# Patient Record
Sex: MALE | Race: WHITE | URBAN - METROPOLITAN AREA
[De-identification: names, ages, dates, MRNs, and addresses within clinical notes are randomized per-mention and may not be internally consistent; named-entity substitution may affect disease eponyms.]

---

## 2018-03-29 ENCOUNTER — TELEPHONE (OUTPATIENT)
Dept: BEHAVIORAL HEALTH | Facility: CLINIC | Age: 43
End: 2018-03-29

## 2018-03-29 ENCOUNTER — HOSPITAL ENCOUNTER (INPATIENT)
Facility: CLINIC | Age: 43
LOS: 6 days | Discharge: HOME | DRG: 885 | End: 2018-04-04
Attending: FAMILY MEDICINE | Admitting: PSYCHIATRY & NEUROLOGY
Payer: COMMERCIAL

## 2018-03-29 DIAGNOSIS — F22 DELUSION (H): ICD-10-CM

## 2018-03-29 DIAGNOSIS — F31.12 BIPOLAR AFFECTIVE DISORDER, CURRENTLY MANIC, MODERATE (H): ICD-10-CM

## 2018-03-29 DIAGNOSIS — F29 PSYCHOSIS, UNSPECIFIED PSYCHOSIS TYPE (H): Primary | ICD-10-CM

## 2018-03-29 LAB
ALBUMIN UR-MCNC: NEGATIVE MG/DL
AMPHETAMINES UR QL SCN: NEGATIVE
ANION GAP SERPL CALCULATED.3IONS-SCNC: 9 MMOL/L (ref 3–14)
APPEARANCE UR: CLEAR
BARBITURATES UR QL: NEGATIVE
BASOPHILS # BLD AUTO: 0 10E9/L (ref 0–0.2)
BASOPHILS NFR BLD AUTO: 0.3 %
BENZODIAZ UR QL: NEGATIVE
BILIRUB UR QL STRIP: NEGATIVE
BUN SERPL-MCNC: 14 MG/DL (ref 7–30)
CALCIUM SERPL-MCNC: 8.8 MG/DL (ref 8.5–10.1)
CANNABINOIDS UR QL SCN: NEGATIVE
CHLORIDE SERPL-SCNC: 107 MMOL/L (ref 94–109)
CO2 SERPL-SCNC: 26 MMOL/L (ref 20–32)
COCAINE UR QL: NEGATIVE
COLOR UR AUTO: NORMAL
CREAT SERPL-MCNC: 0.75 MG/DL (ref 0.66–1.25)
DIFFERENTIAL METHOD BLD: NORMAL
EOSINOPHIL # BLD AUTO: 0.1 10E9/L (ref 0–0.7)
EOSINOPHIL NFR BLD AUTO: 0.8 %
ERYTHROCYTE [DISTWIDTH] IN BLOOD BY AUTOMATED COUNT: 12.8 % (ref 10–15)
ETHANOL UR QL SCN: NEGATIVE
GFR SERPL CREATININE-BSD FRML MDRD: >90 ML/MIN/1.7M2
GLUCOSE SERPL-MCNC: 136 MG/DL (ref 70–99)
GLUCOSE UR STRIP-MCNC: NEGATIVE MG/DL
HCT VFR BLD AUTO: 42.6 % (ref 40–53)
HGB BLD-MCNC: 14.1 G/DL (ref 13.3–17.7)
HGB UR QL STRIP: NEGATIVE
IMM GRANULOCYTES # BLD: 0 10E9/L (ref 0–0.4)
IMM GRANULOCYTES NFR BLD: 0.2 %
KETONES UR STRIP-MCNC: NEGATIVE MG/DL
LEUKOCYTE ESTERASE UR QL STRIP: NEGATIVE
LYMPHOCYTES # BLD AUTO: 1.6 10E9/L (ref 0.8–5.3)
LYMPHOCYTES NFR BLD AUTO: 26 %
MCH RBC QN AUTO: 30.2 PG (ref 26.5–33)
MCHC RBC AUTO-ENTMCNC: 33.1 G/DL (ref 31.5–36.5)
MCV RBC AUTO: 91 FL (ref 78–100)
MONOCYTES # BLD AUTO: 0.3 10E9/L (ref 0–1.3)
MONOCYTES NFR BLD AUTO: 4.5 %
NEUTROPHILS # BLD AUTO: 4.1 10E9/L (ref 1.6–8.3)
NEUTROPHILS NFR BLD AUTO: 68.2 %
NITRATE UR QL: NEGATIVE
NRBC # BLD AUTO: 0 10*3/UL
NRBC BLD AUTO-RTO: 0 /100
OPIATES UR QL SCN: NEGATIVE
PH UR STRIP: 5 PH (ref 5–7)
PLATELET # BLD AUTO: 260 10E9/L (ref 150–450)
POTASSIUM SERPL-SCNC: 3.7 MMOL/L (ref 3.4–5.3)
RBC # BLD AUTO: 4.67 10E12/L (ref 4.4–5.9)
SODIUM SERPL-SCNC: 142 MMOL/L (ref 133–144)
SOURCE: NORMAL
SP GR UR STRIP: 1.01 (ref 1–1.03)
TSH SERPL DL<=0.005 MIU/L-ACNC: 1.42 MU/L (ref 0.4–4)
UROBILINOGEN UR STRIP-MCNC: NORMAL MG/DL (ref 0–2)
WBC # BLD AUTO: 6 10E9/L (ref 4–11)

## 2018-03-29 PROCEDURE — 12400007 ZZH R&B MH INTERMEDIATE UMMC

## 2018-03-29 PROCEDURE — 25000132 ZZH RX MED GY IP 250 OP 250 PS 637: Performed by: STUDENT IN AN ORGANIZED HEALTH CARE EDUCATION/TRAINING PROGRAM

## 2018-03-29 PROCEDURE — 25000132 ZZH RX MED GY IP 250 OP 250 PS 637: Performed by: EMERGENCY MEDICINE

## 2018-03-29 PROCEDURE — 80048 BASIC METABOLIC PNL TOTAL CA: CPT | Performed by: EMERGENCY MEDICINE

## 2018-03-29 PROCEDURE — 90791 PSYCH DIAGNOSTIC EVALUATION: CPT

## 2018-03-29 PROCEDURE — 85025 COMPLETE CBC W/AUTO DIFF WBC: CPT | Performed by: EMERGENCY MEDICINE

## 2018-03-29 PROCEDURE — 80307 DRUG TEST PRSMV CHEM ANLYZR: CPT | Performed by: FAMILY MEDICINE

## 2018-03-29 PROCEDURE — 99285 EMERGENCY DEPT VISIT HI MDM: CPT | Mod: 25 | Performed by: EMERGENCY MEDICINE

## 2018-03-29 PROCEDURE — 80320 DRUG SCREEN QUANTALCOHOLS: CPT | Performed by: FAMILY MEDICINE

## 2018-03-29 PROCEDURE — 81003 URINALYSIS AUTO W/O SCOPE: CPT | Performed by: EMERGENCY MEDICINE

## 2018-03-29 PROCEDURE — 99285 EMERGENCY DEPT VISIT HI MDM: CPT | Mod: Z6 | Performed by: EMERGENCY MEDICINE

## 2018-03-29 PROCEDURE — 84443 ASSAY THYROID STIM HORMONE: CPT | Performed by: EMERGENCY MEDICINE

## 2018-03-29 RX ORDER — MULTIVITAMIN,THERAPEUTIC
0.5 TABLET ORAL DAILY
COMMUNITY

## 2018-03-29 RX ORDER — ACETAMINOPHEN 325 MG/1
650 TABLET ORAL EVERY 4 HOURS PRN
Status: DISCONTINUED | OUTPATIENT
Start: 2018-03-29 | End: 2018-04-04 | Stop reason: HOSPADM

## 2018-03-29 RX ORDER — NICOTINE 21 MG/24HR
1 PATCH, TRANSDERMAL 24 HOURS TRANSDERMAL ONCE
Status: COMPLETED | OUTPATIENT
Start: 2018-03-29 | End: 2018-03-29

## 2018-03-29 RX ORDER — OLANZAPINE 10 MG/1
10 TABLET ORAL
Status: DISCONTINUED | OUTPATIENT
Start: 2018-03-29 | End: 2018-04-04 | Stop reason: HOSPADM

## 2018-03-29 RX ORDER — ALUMINA, MAGNESIA, AND SIMETHICONE 2400; 2400; 240 MG/30ML; MG/30ML; MG/30ML
30 SUSPENSION ORAL EVERY 4 HOURS PRN
Status: DISCONTINUED | OUTPATIENT
Start: 2018-03-29 | End: 2018-04-04 | Stop reason: HOSPADM

## 2018-03-29 RX ORDER — POLYETHYLENE GLYCOL 3350 17 G
2-4 POWDER IN PACKET (EA) ORAL
Status: DISCONTINUED | OUTPATIENT
Start: 2018-03-29 | End: 2018-04-04 | Stop reason: HOSPADM

## 2018-03-29 RX ORDER — OLANZAPINE 10 MG/2ML
10 INJECTION, POWDER, FOR SOLUTION INTRAMUSCULAR
Status: DISCONTINUED | OUTPATIENT
Start: 2018-03-29 | End: 2018-04-04 | Stop reason: HOSPADM

## 2018-03-29 RX ORDER — HYDROXYZINE HYDROCHLORIDE 25 MG/1
25 TABLET, FILM COATED ORAL EVERY 4 HOURS PRN
Status: DISCONTINUED | OUTPATIENT
Start: 2018-03-29 | End: 2018-04-04 | Stop reason: HOSPADM

## 2018-03-29 RX ORDER — IBUPROFEN 200 MG
200-400 TABLET ORAL
Status: DISCONTINUED | OUTPATIENT
Start: 2018-03-29 | End: 2018-03-29

## 2018-03-29 RX ORDER — MULTIVITAMIN,THERAPEUTIC
1 TABLET ORAL DAILY
Status: DISCONTINUED | OUTPATIENT
Start: 2018-03-29 | End: 2018-04-04 | Stop reason: HOSPADM

## 2018-03-29 RX ORDER — TRAZODONE HYDROCHLORIDE 50 MG/1
50 TABLET, FILM COATED ORAL
Status: DISCONTINUED | OUTPATIENT
Start: 2018-03-29 | End: 2018-04-04 | Stop reason: HOSPADM

## 2018-03-29 RX ADMIN — THERA TABS 1 TABLET: TAB at 19:48

## 2018-03-29 RX ADMIN — NICOTINE 1 PATCH: 21 PATCH, EXTENDED RELEASE TRANSDERMAL at 12:26

## 2018-03-29 ASSESSMENT — ACTIVITIES OF DAILY LIVING (ADL)
LAUNDRY: WITH SUPERVISION
GROOMING: HANDWASHING;SHOWER;INDEPENDENT
DRESS: STREET CLOTHES;INDEPENDENT
ORAL_HYGIENE: INDEPENDENT

## 2018-03-29 NOTE — LETTER
April 4, 2018      To Whom It May Concern:      Juan Daniel Woodall was admitted to our facility from 3/29-4/4/2018.      Sincerely,        Wyatt Correa MD

## 2018-03-29 NOTE — PROGRESS NOTES
Patient reports hx of smoking (smokes at least 10 cigarettes per day). He reports history of occasional alcohol intake and currently denies alcohol withdrawals. Admission assessment not completed. Report given to PM shift RN

## 2018-03-29 NOTE — TELEPHONE ENCOUNTER
S: Jaja gave clinical saying pt was bib his mom to er due to delusional thinking.  B: Last night he told his pa's an elaborate story which started with a video of a UFO. He says he thinks he is being videotaped and being followed. He says he thinks he's been selected to work with aliens. Hx of psychosis in 2014. Hx of psych admit then. His thoughts started about 2 months ago and have become worse in past 2 wks. 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 He has only been sleeping 2-4 hrs/night for past 2 wks..He stopped his psych meds about a yr ago. His brother committed suicide in 2005. Utox neg. Hx of marij use. Last use 3-4 wks ago. No chronic med prob's besides migraines.   A: vol, coop, med cleared, denies SI.   R: author paged bautista at 11:56 am and again at 12:53 pm; #22/bautista accepted for himself and said ok that labs have not yet been collected;  #22 Rn informed at 1:06 pm; er informed at 1:07 pm   Resident paged at 1:09 pm   Per Bautista, he will notify resident of admit and have him inform spencer resident. No other notification needed by intake, per Bautista alvarado

## 2018-03-29 NOTE — IP AVS SNAPSHOT
MRN:[id number]                      After Visit Summary   3/29/2018    Juan Daniel Woodall    MRN: [id number]           Thank you!     Thank you for choosing [facility] for your care. Our goal is always to provide you with excellent care.        Patient Information     Date Of Birth          1975        About your hospital stay     You were admitted on:  March 29, 2018 You last received care in the:  [facility]    You were discharged on:  April 4, 2018       Who to Call     For medical emergencies, please call 911.  For non-urgent questions about your medical care, please call your primary care provider or clinic, None          Attending Provider     Provider Specialty    Fredy Mann MD Family Practice    Jhony Lamb MD Psychiatry       Primary Care Provider Fax #    Physician No Ref-Primary [phone]      Further instructions from your care team        Behavioral Discharge Planning and Instructions      Summary:  You were admitted on 3/29/2018  due to Psychotic Symptomology.  You were treated by Dr. Jhony Lamb MD and discharged on 4/3/18 from [facility] Home with follow up in place.       Principal Diagnosis: Schizophrenia vs Schizoaffective vs Bipolar disorder w/ psychotic features      Health Care Follow-up Appointments:     Sally Rios NP- Friday April 13th 10am (Medication Management)  [facility] [address] [place] [address], [address]  [place] [address]  Phone: [phone] Fax: [phone]    Attend all scheduled appointments with your outpatient providers. Call at least 24 hours in advance if you need to reschedule an appointment to ensure continued access to your outpatient providers.   Major Treatments, Procedures and Findings:  You were provided with: a psychiatric assessment, medication evaluation and/or management, group therapy and milieu management    Symptoms to Report: increased confusion, losing more sleep or mood getting worse    Early warning  "signs can include: increased depression or anxiety increased thoughts or behaviors of suicide or self-harm  increased unusual thinking, such as paranoia or hearing voices    Safety and Wellness:  Take all medicines as directed.  Make no changes unless your doctor suggests them.      Follow treatment recommendations.  Refrain from alcohol and non-prescribed drugs.  If there is a concern for safety, call 911.    Resources:   Crisis Intervention: 782.854.5686 or 296-721-9240 (TTY: 124.805.8382).  Call anytime for help.  National [place] on Mental Illness (www.[place].adelina.org): 696.967.6782 or 909-894-1884.  Suicide Awareness Voices of Education (SAVE) (www.save.org): 849-412-BUCV (6128)  National Suicide Prevention Line (www.mentalhealthmn.org): 216-895-ZHJN (2389)  Mental Health Consumer/Survivor Network of [place] (www.mhcsn.net): 978.505.8053 or 771-933-1130  Mental Health Association of [place] (www.mentalhealth.org): 786.795.8749 or 327-545-9328  Self- Management and Recovery Training., SMART-- Toll free: 450.123.9206  www.[employer].org  [facility] Crisis Response 565-228-4631  Text 4 Life: txt \"LIFE\" to 04759 for immediate support and crisis intervention  Crisis text line: Text \"START\" to 807-207. Free, confidential, 24/7.  Crisis Intervention: 934.323.1975 or 430-390-9801. Call anytime for help.     The treatment team has appreciated the opportunity to work with you.     If you have any questions or concerns our unit number is [phone]  You may be receiving a follow-up phone call within the next three days from a representative from behavioral health.            Pending Results     No orders found from 3/27/2018 to 3/30/2018.            Statement of Approval     Ordered          04/04/18 1107  I have reviewed and agree with all the recommendations and orders detailed in this document.  EFFECTIVE NOW     Approved and electronically signed by:  Wyatt Correa MD             Admission Information     Date & Time " "Provider Department Dept. Phone    3/29/2018 Jhony Lamb MD  [facility] [phone]      Your Vitals Were     Blood Pressure Pulse Temperature Respirations Height Weight    121/78 71 97  F (36.1  C) 16 1.88 m (6' 2\") 74.8 kg (165 lb)    Pulse Oximetry BMI (Body Mass Index)                100% 21.18 kg/m2          [employer] Information     Connect2me lets you send messages to your doctor, view your test results, renew your prescriptions, schedule appointments and more. To sign up, go to [email or web address] . Click on \"Log in\" on the left side of the screen, which will take you to the Welcome page. Then click on \"Sign up Now\" on the right side of the page.     You will be asked to enter the access code listed below, as well as some personal information. Please follow the directions to create your username and password.     Your access code is: [email or web address]  Expires: 7/3/2018 11:50 AM     Your access code will  in 90 days. If you need help or a new code, please call your [facility] clinic or [phone].        Care EveryWhere ID     This is your Care EveryWhere ID. This could be used by other organizations to access your [facility] medical records  [id number]        Equal Access to Services     LEONILA DYSON AH: Hadii heraclio navao Sogina, waaxda luqadaha, qaybta kaalmada adeegyada, [place] . So [facility] [phone].    ATENCIÓN: Si habla español, tiene a jolley disposición servicios gratuitos de asistencia lingüística. Llame al [phone].    We comply with applicable federal civil rights laws and [place] laws. We do not discriminate on the basis of race, color, national origin, age, disability, sex, sexual orientation, or gender identity.               Review of your medicines      START taking        Dose / Directions    risperiDONE 4 MG tablet   Commonly known as:  risperDAL   Used for:  Psychosis, unspecified psychosis type        Dose:  4 mg   Take 1 tablet (4 mg) by mouth At " Bedtime   Quantity:  30 tablet   Refills:  0         CONTINUE these medicines which have NOT CHANGED        Dose / Directions    multivitamin, therapeutic Tabs tablet        Dose:  0.5 tablet   Take 0.5 tablets by mouth daily   Refills:  0         STOP taking     ADVIL -38 MG Tabs   Generic drug:  Ibuprofen-Diphenhydramine Cit           diphenhydrAMINE-acetaminophen  MG tablet   Commonly known as:  TYLENOL PM                Where to get your medicines      These medications were sent to [facility] [place], [place] - [address]  [address] [facility], [facility] [address]     Phone:  [phone]     risperiDONE 4 MG tablet                Protect others around you: Learn how to safely use, store and throw away your medicines at www.disposemymeds.org.             Medication List: This is a list of all your medications and when to take them. Check marks below indicate your daily home schedule. Keep this list as a reference.      Medications           Morning Afternoon Evening Bedtime As Needed    multivitamin, therapeutic Tabs tablet   Take 0.5 tablets by mouth daily   Last time this was given:  1 tablet on 4/3/2018  9:00 AM                                risperiDONE 4 MG tablet   Commonly known as:  risperDAL   Take 1 tablet (4 mg) by mouth At Bedtime   Last time this was given:  4 mg on 4/3/2018 10:03 PM

## 2018-03-29 NOTE — ED NOTES
ED to Behavioral Floor Handoff    SITUATION  Juan Daniel Woodall is a 42 year old male who speaks English and lives in a home with family members The patient arrived in the ED by private car from home with a complaint of Psychiatric Evaluation (paranoid thoughts about being followed, believes this may have to do with being selected to work with alien life )  .The patient's current symptoms started/worsened 1 week(s) ago and during this time the symptoms have increased.   In the ED, pt was diagnosed with   Final diagnoses:   Bipolar affective disorder, currently manic, moderate (H)        Initial vitals were: BP: (!) 158/96  Pulse: 64  Temp: 96.3  F (35.7  C)  Resp: 18  SpO2: 100 %   --------  Is the patient diabetic? No   If yes, last blood glucose? --     If yes, was this treated in the ED? --  --------  Is the patient inebriated (ETOH) No or Impaired on other substances? No  MSSA done? N/A  Last MSSA score: --    Were withdrawal symptoms treated? N/A  Does the patient have a seizure history? No. If yes, date of most recent seizure--  --------  Is the patient patient experiencing suicidal ideation? denies current or recent suicidal ideation     Homicidal ideation? denies current or recent homicidal ideation or behaviors.    Self-injurious behavior/urges? denies current or recent self injurious behavior or ideation.  ------  Was pt aggressive in the ED No  Was a code called No  Is the pt now cooperative? Yes  -------  Meds given in ED:   Medications   nicotine (NICODERM CQ) 21 MG/24HR 24 hr patch 1 patch (1 patch Transdermal Given 3/29/18 1226)      Family present during ED course? Yes  Family currently present? Yes    BACKGROUND  Does the patient have a cognitive impairment or developmental disability? No  Allergies: Not on File.   Social demographics are   Social History     Social History     Marital status:      Spouse name: N/A     Number of children: N/A     Years of education: N/A     Social History Main  Topics     Smoking status: Current Every Day Smoker     Packs/day: 0.50     Years: 23.00     Smokeless tobacco: Never Used     Alcohol use Yes      Comment: 12 oz wine per day      Drug use: 7.00 per week     Special: Marijuana      Comment: hasn't for 3-4 weeks, daily before that      Sexual activity: Not Asked     Other Topics Concern     None     Social History Narrative     None        ASSESSMENT  Labs results   Labs Ordered and Resulted from Time of ED Arrival Up to the Time of Departure from the ED   DRUG ABUSE SCREEN 6 CHEM DEP URINE ([facility])   UA MACROSCOPIC WITH REFLEX TO MICRO AND CULTURE   TSH WITH FREE T4 REFLEX   BASIC METABOLIC PANEL   CBC WITH PLATELETS DIFFERENTIAL      Imaging Studies: No results found for this or any previous visit (from the past 24 hour(s)).   Most recent vital signs BP (!) 158/96  Pulse 64  Temp 96.3  F (35.7  C) (Oral)  Resp 18  SpO2 100%   Abnormal labs/tests/findings requiring intervention:---   Pain control: pt had none  Nausea control: pt had none    RECOMMENDATION  Are any infection precautions needed (MRSA, VRE, etc.)? No If yes, what infection? --  ---  Does the patient have mobility issues? independently. If yes, what device does the pt use? ---  ---  Is patient on 72 hour hold or commitment? No If on 72 hour hold, have hold and rights been given to patient? N/A  Are admitting orders written if after 10 p.m. ?No  Tasks needing to be completed:---     EMILIANO OLMOS--    [phone] [facility]   [phone] [facility] ED

## 2018-03-29 NOTE — PHARMACY-ADMISSION MEDICATION HISTORY
Admission medication history interview status for the 3/29/2018 admission is complete. See Epic admission navigator for allergy information, pharmacy, prior to admission medications and immunization status.     Medication history interview sources:  Patient    Changes made to PTA medication list (reason)  Added: multivitamin, Tylenol PM, Advil PM  Deleted: none  Changed: none    Additional medication history information (including reliability of information, actions taken by pharmacist): The patient was a a reliable historian. He reported he does not take any prescription medications. The patient reported he takes a half tablet of a multivitamin 4-5 times per week. He also reported occasionally using Tylenol PM or Advil PM to help him sleep but has not used it for about two weeks. The patient also reported consuming coffee and energy drinks daily so recommend monitoring for caffeine withdrawal headaches.      Prior to Admission medications    Medication Sig Last Dose Taking? Auth Provider   multivitamin, therapeutic (THERA-VIT) TABS tablet Take 0.5 tablets by mouth daily unknown Yes Unknown, Entered By History   diphenhydrAMINE-acetaminophen (TYLENOL PM)  MG tablet Take 1-2 tablets by mouth nightly as needed for sleep Past Month at Unknown time Yes Unknown, Entered By History   Ibuprofen-Diphenhydramine Cit (ADVIL PM) 200-38 MG TABS Take 1-2 tablets by mouth nightly as needed (sleep) Past Month at Unknown time Yes Unknown, Entered By History       Medication history completed by:  Shaina Ramírez, PharmD, BCPP  Behavioral ER Pharmacist  [phone]

## 2018-03-29 NOTE — PROGRESS NOTES
"Patient brought to the unit from ED at 1430. Admitted due to psychosis. Patient making delusional statements about being followed and videotaped and followed. Says \" I believe human being are working together with extraterrestrial forces outside the earth\". States that animals are communicating with higher intelligence. \" My cat seem to know what is going on\". Patient currently denies A&V hallucinations. He reports hx of mental health treatment. Denies SI/SIB/HI. Reports family hx of suicide. \" My brother committed suicide in April 15, 2005, but I am not sure 100% because I never saw the body\". Patient denies hx of physical, emotional, and sexual abuse.  "

## 2018-03-29 NOTE — IP AVS SNAPSHOT
[address]    [facility] [address]    Phone:  [phone]                                       After Visit Summary   3/29/2018    Juan Daniel Woodall    MRN: [id number]           After Visit Summary Signature Page     I have received my discharge instructions, and my questions have been answered. I have discussed any challenges I see with this plan with the nurse or doctor.    ..........................................................................................................................................  Patient/Patient Representative Signature      ..........................................................................................................................................  Patient Representative Print Name and Relationship to Patient    ..................................................               ................................................  Date                                            Time    ..........................................................................................................................................  Reviewed by Signature/Title    ...................................................              ..............................................  Date                                                            Time

## 2018-03-29 NOTE — ED NOTES
January 2014 hospitalized for 2 weeks in [place] for mental health after having paranoid thoughts about being followed by black SUV's. Felt stressed and restless about this. At this time, having paranoid thoughts about being followed again but feeling calm about it. Family concerned with thoughts about being followed as well as ideas about alien life.

## 2018-03-29 NOTE — ED PROVIDER NOTES
"  History     Chief Complaint   Patient presents with     Psychiatric Evaluation     paranoid thoughts about being followed, believes this may have to do with being selected to work with alien life      HPI  Juan Daniel Woodall is a 42 year old male who carries a previous history of bipolar disorder (although he denies it) and was brought to the ER because of concerns of his behavior by his family members.  The patient's history goes back to 2012 and episodes where he had paranoid delusions of his computer being tapped and him being video monitored by his employer at that time.  He lived in [facility] and apparently had been diagnosed with bipolar disorder and placed on \"a number of medications\" that he eventually discontinued or \"did not work\".  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  Patient had been functioning fairly well and at some point by January 2017 had discontinued the Zoloft that he had been on that time.  Over the past several months the patient has not been sleeping well.  He has been averaging 2 hours a night and states that he has had some problems with his cognition that he attributes to not being able to focus but can be translated into racing thoughts.  Patient states he has been doing fairly well on the job and has been trying to run from house to house so that he can get his route done on time.  Patient expresses some unusual interactions with his employer and with fellow employees.  He relates interaction where he told male coworker that he had a dream about him with an awkward interaction between the 2.  Patient states that he has been getting some secret messages through music that he has been listening to.  He states he is not the best shape of his life ever and states that he believes and UFOs and believes he videotaped a UFO recently.  Patient states he believes aliens are living among us " for a positive reason and that he is part of all of that.  He states he is not an alien though.  Patient states he has no idea what part he is playing but that he is part of it.    Patient denies any medical concerns at this time and exhibits rambling thoughts.    I have reviewed the Medications, Allergies, Past Medical and Surgical History, and Social History in the Epic system.    From Care Everywhere at [facility]:  Current Medications  Reconcile with Patient's Chart    Prescription Sig. Disp. Refills Start Date End Date Status   nicotine 14 mg/24 hr (NICODERM; HABITROL) 14 mg/24 hr patch    Indications: Encounter for smoking cessation counseling Apply 1 Patch on dry, clean, hairless skin once daily. 30 Patch   5 01/31/2017   Active   nicotine (COMMIT) 2 mg lozenge    Indications: Encounter for smoking cessation counseling Place 1 Lozenge in mouth, between cheek & gum every hour if needed for Nicotine Craving. 30 Lozenge   3 01/31/2017   Active   Active Problems  Reconcile with Patient's Chart    Problem Noted Date   Bipolar disorder (HC)     Immunizations  Reconcile with Patient's Chart    Name Dates Previously Given Next Due   Tdap 01/31/2017     Medical History    Medical History Date Comments   Bipolar disorder (HC)       Family History    Medical History Relation Name Comments   Psychiatric illness Brother   Bipolar disorder   Psychiatric illness Brother   Depression   Asthma Father       Heart Disease Maternal Grandfather       Stroke Maternal Grandfather       Heart Disease Maternal Grandmother       Hypertension Maternal Grandmother       Stroke Maternal Grandmother       Arthritis Mother       Hypertension Mother       Heart Disease Paternal Grandfather       Cancer-breast Paternal Grandmother         Relation Name Status Comments   Brother         Brother         Father         Maternal Grandfather         Maternal Grandmother         Mother         Paternal Grandfather         Paternal Grandmother          Social History    Tobacco Use Types Packs/Day Years Used Date   Current Every Day Smoker Cigars, Cigarettes 1   Quit: 05/19/2013   Smokeless Tobacco: Never Used           Tobacco Cessation: Counseling Given: Yes  Comments: 8 cigars/day     Alcohol Use Drinks/Week oz/Week Comments   Yes     4 glasses of wine a week     Sex Assigned at Birth Date Recorded   Not on file      At the line of the patient had been on Zoloft in 2013 but quit all of his medications as of a year ago according to their records.    [facility]:  History reviewed. No pertinent past medical history.    No current facility-administered medications on file prior to encounter.   No current outpatient prescriptions on file prior to encounter.  Not on File  Social History     Social History     Marital status:      Spouse name: N/A     Number of children: N/A     Years of education: N/A     Occupational History     Not on file.     Social History Main Topics     Smoking status: Current Every Day Smoker     Packs/day: 0.50     Years: 23.00     Smokeless tobacco: Never Used     Alcohol use Yes      Comment: 12 oz wine per day      Drug use: 7.00 per week     Special: Marijuana      Comment: hasn't for 3-4 weeks, daily before that      Sexual activity: Not on file     Other Topics Concern     Not on file     Social History Narrative     No narrative on file     History reviewed. No pertinent surgical history.  No family history on file.      Review of Systems   All other systems reviewed and are negative.      Physical Exam   BP: (!) 158/96  Pulse: 64  Temp: 96.3  F (35.7  C)  Resp: 18  SpO2: 100 %      Physical Exam   Constitutional: He is oriented to person, place, and time.   Alert conversant rambling speech   HENT:   Head: Atraumatic.   Eyes: EOM are normal. Pupils are equal, round, and reactive to light.   Neck: Neck supple.   Pulmonary/Chest: No respiratory distress.   Musculoskeletal: He exhibits no edema or  tenderness.   Neurological: He is alert and oriented to person, place, and time. No cranial nerve deficit.   Skin: Skin is warm.   Psychiatric:   Patient exhibits delusional and paranoid thoughts and unusual interactions with others.  Mixed in is denial and grandeur.        ED Course     ED Course     Procedures        Results for orders placed or performed during the hospital encounter of 03/29/18   Drug abuse screen 6 urine (chem dep) ([facility])   Result Value Ref Range    Amphetamine Qual Urine Negative NEG^Negative    Barbiturates Qual Urine Negative NEG^Negative    Benzodiazepine Qual Urine Negative NEG^Negative    Cannabinoids Qual Urine Negative NEG^Negative    Cocaine Qual Urine Negative NEG^Negative    Ethanol Qual Urine Negative NEG^Negative    Opiates Qualitative Urine Negative NEG^Negative   UA reflex to Microscopic and Culture   Result Value Ref Range    Color Urine Light Yellow     Appearance Urine Clear     Glucose Urine Negative NEG^Negative mg/dL    Bilirubin Urine Negative NEG^Negative    Ketones Urine Negative NEG^Negative mg/dL    Specific Gravity Urine 1.012 1.003 - 1.035    Blood Urine Negative NEG^Negative    pH Urine 5.0 5.0 - 7.0 pH    Protein Albumin Urine Negative NEG^Negative mg/dL    Urobilinogen mg/dL Normal 0.0 - 2.0 mg/dL    Nitrite Urine Negative NEG^Negative    Leukocyte Esterase Urine Negative NEG^Negative    Source Unspecified Urine        Labs Ordered and Resulted from Time of ED Arrival Up to the Time of Departure from the ED   DRUG ABUSE SCREEN 6 CHEM DEP URINE ([facility])   UA MACROSCOPIC WITH REFLEX TO MICRO AND CULTURE            Assessments & Plan (with Medical Decision Making)     I have reviewed the nursing notes.    Medications   nicotine (NICODERM CQ) 21 MG/24HR 24 hr patch 1 patch (1 patch Transdermal Given 3/29/18 1226)     After discussing the patient's ongoing problems with him and with family members present, I was able to convince the patient to come into the hospital for  psychiatric evaluation and treatment and that I believe this will make him more functional.  At this time the patient is voluntary.    I have reviewed the findings, diagnosis, and plan with the patient.    Final diagnoses:   Bipolar affective disorder, currently manic, moderate (H)     Admit Psychiatry.    Bala Aguirre MD    3/29/2018   [facility], [place], EMERGENCY DEPARTMENT     Bala Aguirre MD  03/29/18 1248       Bala Aguirre MD  03/29/18 1247

## 2018-03-29 NOTE — H&P
"    -----------------------------------------------------------------------------------------------------------  Psychiatry History & Physical      Juan Daniel Woodall MRN# [id number]   Age: 42 year old YOB: 1975     Date of Admission: 3/29/2018     Interviewed at 5:12 PM          Contacts:   Primary Outpatient Psychiatrist: None  Primary Physician: Dr. Gwyn Chadwick [facility]  Therapist: None  [facility] CM: None  Probation/: [facility]  Family: Mikel Woodall [phone]         Chief Complaint:   \"My parents are concerned about me because I've told them what's going on.\"    History is obtained from the patient and electronic health record         History of Present Illness:   Juan Daniel Woodall is a 42 year old  male with a significant past psychiatric history of  bipolar disorder who presents with concerns raised by his family of behavioral concerns that are similar to previous episode in 2014.    Patient states that his brother insisted he come to be evaluated today because of things he has been telling his family regarding his beliefs about the world.  Since moving back home to [place] several years ago patient has been working as a  at the post office.  Since the holidays work has not slowed down and patient has continued to work 6 days a week 10-12 hours a day leading to reduced sleep and leisure time.  He reports he started being followed about 2 weeks ago and they were \"very obvious about it, taking videos of me\".  He also noted people giving him dirty looks; he thinks it is because he was smoking. 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  He reports the anger had built up and he suddenly realized that his work was for a purpose: working for extraterrestrial life who are on earth.  He thinks that perhaps the extraterrestrials would use his work at the post office to \"achieve a change in the world by a nonviolent means using technology and methods that I do not " "understand\".  He further elaborated \"I believe in an international effort with government organizations and different countries and different species with extraterrestrial beings involved are trying to help mankind improve his condition\".  When he told his family about these beliefs his brother insisted on coming to the hospital for an evaluation.    The last time patient had a similar episode was 2013 when patient became convinced that he was being followed by \"black SUVs\" when he was going to work at the [employer], causing him to take different routes to work daily.  He suspected his boss was in on the surveillance, as his boss asked him once \"what route do you take to work, Grabiel?\"  Patient also highly suspicious that his passwords were being monitored as well at that time.  He also recalled being told by unspecified individuals that \"do you know how to take a SIM card out of a phone?\" and he took this to mean that they were tracking him.  During this period he recalls having an episode where he saw a \"black shadow on the wall\" in a very \"awe-inspiring way\" that ultimately took the form of the silhouette of a head and shoulders and subsequently magicians have out of which shapes would appear.  He felt his boss was treating him in a \"outrageous way\" and recalls getting into disputes with individuals who were acting inappropriately.  At that point he was told to see a mental health counselor and subsequently had a \"bit of a breakdown\" where he decided to post on Facebook all the worst things he ever did in his life.  Contained in this confession was the admission of doing a number of drugs then lying about it on job application paperwork.  He was subsequently interviewed by his boss, his security clearance was revoked and he was placed on paid administrative leave and ultimately admitted in January 2014 to psychiatric hospital in [place], [place] under the care of \"Geovany Prather\" (Perhaps Dr. Angulo" "Ascencion?), where he was treated for several weeks on medications patient does not quite remember but may have included Depakote, Cogentin, and fluphenazine.  Subsequent to his admission he was on administrative leave for 2 years before ultimately moving back to [place]. The only medication he remembers for sure is Zoloft, which he took for some period of time but ultimately stopped taking it a year ago.    Patient reports sleeping approximately 2-4 hours a night for the past several weeks and that he feels tired during the day but given the amount of sleep he has been having he feels like he should be more tired than he is.  He denies other symptoms of doug.  He does not feel he has ever been manic.  He does endorse certain depressive symptoms including feelings of hopelessness, helplessness, and anhedonia, however he does not feel that he is depressed or has ever felt depressed.  Patient unsure if he has had auditory hallucinations in the past.  He has vague recollections of perhaps hearing his name called at different times but is not able to think of specific examples recently.  He does endorse thought broadcasting as when he used to work at [employer] he would walk by individuals at work and think to himself they were extraterrestrial beings in disguise and they would shake their head at him as though they had heard this thought.  He also expressed concern that he may have inadvertently led to Dell Samuel's death.  He recalls seeing a news report about \"Danish murderer\" and emotionally cried out \"why is he still alive?\"  The next day, Dell Bach's death was reported and patient noted that he looked just like the \"Danish murderer\" from the day previous and was very concerned his emotional outburst was directly related.  He also claims to have shot footage of a UFO from his farm in [place], [place] a few weeks ago.         Psychiatric History:   Past Diagnoses: Bipolar Disorder  Past " Hospitalizations: 2014 in [place]  Prior ECT: None  Prior use of Psychotropic Medication: Zoloft, possibly Depakote, possibly Fluphenazine  Court Commitment: None  Past Suicide Attempt: None  Self-injurious Behavior: None         Substance Use History:   Alcohol Use: 5 days a week, 10-15 oz per session, red wine (2-3 glasses)  Cigarettes: 10 cigarettes a day, 22 years  Marijuana: Occasional use  Cocaine: Once  Hash: Once  Mushrooms: Twice  Ecstasy: Twice          Psychiatric Review of Systems:   Depression:   Reports: Hopelessness, helplessness, anhedonia, ruminations  Denies: Feelings of hopelessness and helplessness, anhedonia, decreased energy, ruminations and feelings of guilt. Denies suicidal ideation.    Anxiety:   Reports: He endorses symptoms of anxiety in the setting of depression.   Denies: Being a worrier    Oumou:   Reports:   Denies: Racing thoughts, increase in goal-directed activity, pressured speech, engaging in uncharacteristic behaviors, and decreased need for sleep. No history of oumou.     Psychosis:   Reports: Possible AH, VH, Paranoia, delusions, thought broadcasting (remote), ideas of reference  Denies: Thought insertion    PTSD: Positive for nightmares  OCD: Negative for negative  Eating Disorder: Negative for negative  No history of ADD/ADHD   No history or symptoms of ODD   No history of conduct disorder         Medical Review of Systems:   The Review of Systems is negative other than noted in the HPI          Past Medical History   This patient has no significant past medical history  No History of: hepatitis, HIV, head trauma with or without loss of consciousness and seizures         Medications:     Prescriptions Prior to Admission   Medication Sig Dispense Refill Last Dose     multivitamin, therapeutic (THERA-VIT) TABS tablet Take 0.5 tablets by mouth daily   unknown     diphenhydrAMINE-acetaminophen (TYLENOL PM)  MG tablet Take 1-2 tablets by mouth nightly as needed for sleep    Past Month at Unknown time     Ibuprofen-Diphenhydramine Cit (ADVIL PM) 200-38 MG TABS Take 1-2 tablets by mouth nightly as needed (sleep)   Past Month at Unknown time            Allergies:   No Known Allergies        Family History:    Depression: brother  Bipolar: brother  Suicides: brother        Social History   Upbringing: Born and raised in [place], [place] with three older brothers, Mom and Dad  Relationships/Current Living Situation: Live with Parents in [place], Cat named Peanut, Cat named Mittens  Education/Occupation: Two Bachelors Degrees, Film: [employer], English: [employer]  Legal History: None  Abuse History: None         Labs:     Results for orders placed or performed during the hospital encounter of 03/29/18 (from the past 24 hour(s))   Drug abuse screen 6 urine (chem dep) ([facility])   Result Value Ref Range    Amphetamine Qual Urine Negative NEG^Negative    Barbiturates Qual Urine Negative NEG^Negative    Benzodiazepine Qual Urine Negative NEG^Negative    Cannabinoids Qual Urine Negative NEG^Negative    Cocaine Qual Urine Negative NEG^Negative    Ethanol Qual Urine Negative NEG^Negative    Opiates Qualitative Urine Negative NEG^Negative   UA reflex to Microscopic and Culture   Result Value Ref Range    Color Urine Light Yellow     Appearance Urine Clear     Glucose Urine Negative NEG^Negative mg/dL    Bilirubin Urine Negative NEG^Negative    Ketones Urine Negative NEG^Negative mg/dL    Specific Gravity Urine 1.012 1.003 - 1.035    Blood Urine Negative NEG^Negative    pH Urine 5.0 5.0 - 7.0 pH    Protein Albumin Urine Negative NEG^Negative mg/dL    Urobilinogen mg/dL Normal 0.0 - 2.0 mg/dL    Nitrite Urine Negative NEG^Negative    Leukocyte Esterase Urine Negative NEG^Negative    Source Unspecified Urine    TSH with free T4 reflex   Result Value Ref Range    TSH 1.42 0.40 - 4.00 mU/L   Basic metabolic panel   Result Value Ref Range    Sodium 142 133 - 144  "mmol/L    Potassium 3.7 3.4 - 5.3 mmol/L    Chloride 107 94 - 109 mmol/L    Carbon Dioxide 26 20 - 32 mmol/L    Anion Gap 9 3 - 14 mmol/L    Glucose 136 (H) 70 - 99 mg/dL    Urea Nitrogen 14 7 - 30 mg/dL    Creatinine 0.75 0.66 - 1.25 mg/dL    GFR Estimate >90 >60 mL/min/1.7m2    GFR Estimate If Black >90 >60 mL/min/1.7m2    Calcium 8.8 8.5 - 10.1 mg/dL   CBC with platelets differential   Result Value Ref Range    WBC 6.0 4.0 - 11.0 10e9/L    RBC Count 4.67 4.4 - 5.9 10e12/L    Hemoglobin 14.1 13.3 - 17.7 g/dL    Hematocrit 42.6 40.0 - 53.0 %    MCV 91 78 - 100 fl    MCH 30.2 26.5 - 33.0 pg    MCHC 33.1 31.5 - 36.5 g/dL    RDW 12.8 10.0 - 15.0 %    Platelet Count 260 150 - 450 10e9/L    Diff Method Automated Method     % Neutrophils 68.2 %    % Lymphocytes 26.0 %    % Monocytes 4.5 %    % Eosinophils 0.8 %    % Basophils 0.3 %    % Immature Granulocytes 0.2 %    Nucleated RBCs 0 0 /100    Absolute Neutrophil 4.1 1.6 - 8.3 10e9/L    Absolute Lymphocytes 1.6 0.8 - 5.3 10e9/L    Absolute Monocytes 0.3 0.0 - 1.3 10e9/L    Absolute Eosinophils 0.1 0.0 - 0.7 10e9/L    Absolute Basophils 0.0 0.0 - 0.2 10e9/L    Abs Immature Granulocytes 0.0 0 - 0.4 10e9/L    Absolute Nucleated RBC 0.0             Psychiatric Examination:   /88  Pulse 69  Temp 98.2  F (36.8  C) (Oral)  Resp 16  Ht 1.88 m (6' 2\")  Wt 74.2 kg (163 lb 8 oz)  SpO2 100%  BMI 20.99 kg/m2    Appearance:  awake, alert, adequately groomed, dressed in hospital scrubs and appeared as age stated  Attitude:  cooperative  Eye Contact:  fair, looks around room at times  Mood:  pleasant  Affect:  appropriate and in normal range  Speech:  clear, coherent and tends to ramble  Psychomotor Behavior:  no evidence of tardive dyskinesia, dystonia, or tics  Thought Process:  linear and goal oriented  Associations:  no loose associations  Thought Content:  no evidence of suicidal ideation or homicidal ideation and significant delusional content with paranoia, thought " broadcasting, suggestion that he may have special carver to inflence world events, vague suggestion of both auditory and visual hallucinations  Insight:  limited  Judgment:  fair  Oriented to:  time, person, and place  Attention Span and Concentration:  fair  Recent and Remote Memory:  fair  Language: communicates coherently in conversational context  Fund of Knowledge: appropriate  Muscle Strength and Tone: not formally assessed  Gait and Station: Normal         Physical Examination:   Please refer to note by Bala Aguirre MD, dated 3/29/2018 for details of physical exam.         Assessment:   Juan Daniel Woodall is a 42 year old male with a history of bipolar disorder who presented to the [facility] following concerning behaviors and significant delusions in the context of increased work stress. The patient's last psychiatric hospitalization was in [place], [place] in 2014.  The patient is not currently followed by a psychiatrist. Family history is notable for bipolar disorder, depression and suicide. Current psychosocial stressors include increased work hours, belief that he is being followed, concerns for hidden presence of extraterrestrials. The patient denies recent drug abuse or self injurious behaviors. The MSE is notable for pleasant gentleman who tends to ramble at times with significant delusions, paranoia. The patient's reported symptoms of delusions, paranoia, thought broadcasting, suggestion that he has special carver, possible auditory/visual hallucinations, anhedonia are consistent with diagnosis of schizophreniform disorder, and if viewed in the context of similar episode in 2014 may meet criteria for schizophrenia at this time. Of note, on exam patient does not appear manic or depressed, though collateral from family may be helpful in confirming his mood state and behaviors prior to admission.  PTA medications were continued at time of admission. Given that he is psychotic, patient warrants inpatient psychiatric  hospitalization to maintain his safety. Disposition pending clinical stabilization, medication optimization and development of an appropriate discharge plan.      Plan   Patient to be staffed in AM by Jhony Lamb MD.    Principal Diagnosis: Schizophreniform disorder  R/o Schizophrenia  R/o Bipolar I Disorder with Psychotic Features  Medications:     New:  Olanzapine 10mg PO/IM q2H PRN agitation  Trazodone 50mg qHS PRN sleep  Hydroxyzine 25mg PO q4H PRN anxiety    Acetaminophen 650mg PO q4H PRN mild pain  Mylanta 30mL PO q4H PRN indigestion  Magnesium Hydroxide 30mL PO Daily PRN constipation    Continue:  Diphenhydramine-Acetaminophen 25-325mg PO qHS PRN    Hold:    Advil PM (No reasonable substitute in formulary)  Laboratory/Imaging: Folate, B12, Hematocrit, Hepatic Panel, Lipid Panel    Consults: None    Patient will be treated in therapeutic milieu with appropriate individual and group therapies as described.    Secondary psychiatric diagnoses of concern this admission: None  Plan: As above    Medical diagnoses to be addressed this admission:  None  Medications: As Above    Consults: IM consult for routine medical evaluation. Assistance is appreciated.    Relevant psychosocial stressors: Increased Work hours, stress from numerous psychotic symptoms    Legal Status: Voluntary    Safety Assessment:   Checks: Status 15  Precautions: None  Pt has not required locked seclusion or restraints in the past 24 hours to maintain safety, please refer to RN documentation for further details.       The risks, benefits, alternatives and side effects have been discussed and are understood by the patient and other caregivers.       Anticipated Disposition/Discharge Date: Unknown at this time. Pending further clinical evaluation and stabilization.    Attestation:  Patient has been seen and evaluated by me,  Mychal Hopson MD      Attestation:  For attending attestation statement, see progress note dated March 30, 2018. Jhony  Adonis ROTHMAN

## 2018-03-30 LAB
ALBUMIN SERPL-MCNC: 3.5 G/DL (ref 3.4–5)
ALP SERPL-CCNC: 52 U/L (ref 40–150)
ALT SERPL W P-5'-P-CCNC: 32 U/L (ref 0–70)
AST SERPL W P-5'-P-CCNC: 21 U/L (ref 0–45)
BILIRUB DIRECT SERPL-MCNC: 0.1 MG/DL (ref 0–0.2)
BILIRUB SERPL-MCNC: 0.6 MG/DL (ref 0.2–1.3)
CHOLEST SERPL-MCNC: 145 MG/DL
FOLATE SERPL-MCNC: 25.7 NG/ML
HCT VFR BLD AUTO: 42.6 % (ref 40–53)
HDLC SERPL-MCNC: 86 MG/DL
LDLC SERPL CALC-MCNC: 48 MG/DL
NONHDLC SERPL-MCNC: 59 MG/DL
PROT SERPL-MCNC: 6.5 G/DL (ref 6.8–8.8)
TRIGL SERPL-MCNC: 57 MG/DL
VIT B12 SERPL-MCNC: 703 PG/ML (ref 193–986)

## 2018-03-30 PROCEDURE — 82746 ASSAY OF FOLIC ACID SERUM: CPT | Performed by: PSYCHIATRY & NEUROLOGY

## 2018-03-30 PROCEDURE — 80076 HEPATIC FUNCTION PANEL: CPT | Performed by: PSYCHIATRY & NEUROLOGY

## 2018-03-30 PROCEDURE — 25000132 ZZH RX MED GY IP 250 OP 250 PS 637: Performed by: STUDENT IN AN ORGANIZED HEALTH CARE EDUCATION/TRAINING PROGRAM

## 2018-03-30 PROCEDURE — 12400007 ZZH R&B MH INTERMEDIATE UMMC

## 2018-03-30 PROCEDURE — 80061 LIPID PANEL: CPT | Performed by: PSYCHIATRY & NEUROLOGY

## 2018-03-30 PROCEDURE — 36415 COLL VENOUS BLD VENIPUNCTURE: CPT | Performed by: PSYCHIATRY & NEUROLOGY

## 2018-03-30 PROCEDURE — 82607 VITAMIN B-12: CPT | Performed by: PSYCHIATRY & NEUROLOGY

## 2018-03-30 PROCEDURE — 99223 1ST HOSP IP/OBS HIGH 75: CPT | Mod: AI | Performed by: PSYCHIATRY & NEUROLOGY

## 2018-03-30 PROCEDURE — 90853 GROUP PSYCHOTHERAPY: CPT

## 2018-03-30 PROCEDURE — 97150 GROUP THERAPEUTIC PROCEDURES: CPT | Mod: GO

## 2018-03-30 PROCEDURE — 85014 HEMATOCRIT: CPT | Performed by: PSYCHIATRY & NEUROLOGY

## 2018-03-30 RX ORDER — RISPERIDONE 2 MG/1
2 TABLET ORAL AT BEDTIME
Status: DISCONTINUED | OUTPATIENT
Start: 2018-03-30 | End: 2018-04-02

## 2018-03-30 RX ADMIN — THERA TABS 1 TABLET: TAB at 08:33

## 2018-03-30 RX ADMIN — RISPERIDONE 2 MG: 2 TABLET ORAL at 20:36

## 2018-03-30 ASSESSMENT — ACTIVITIES OF DAILY LIVING (ADL)
DRESS: INDEPENDENT
GROOMING: INDEPENDENT
LAUNDRY: WITH SUPERVISION
ORAL_HYGIENE: INDEPENDENT

## 2018-03-30 NOTE — PLAN OF CARE
Problem: General Plan of Care (Inpatient Behavioral)  Goal: Team Discussion  Team Plan:  BEHAVIORAL TEAM DISCUSSION    Participants:  Randa SANTANA, Dr. Jhony Lamb MD, Kena Qureshi MD  Progress: Initiated with hospitalization  Continued Stay Criteria/Rationale: Came into the ED with the encouragement of family due to paranoid and delusional thoughts.   Medical/Physical: See H&P  Precautions:   Behavioral Orders   Procedures     Code 1 - Restrict to Unit     Routine Programming     As clinically indicated     Status 15     Every 15 minutes.     Plan: Continue to monitor patient's symptoms as a medication regiment is initiated and document changes until proper discharge is set in place.   Rationale for change in precautions or plan: No change has been indicated.

## 2018-03-30 NOTE — PROGRESS NOTES
"Initial Psychosocial Assessment    I have reviewed the chart, met with the patient, and developed Care Plan.  Information for assessment was obtained from:     Patient: Interviewed and Chart: Reviewed    Presenting Problem:  Per H&P: He reports the anger had built up and he suddenly realized that his work was for a purpose: working for extraterrestrial life who are on earth.  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  He further elaborated \"I believe in an international effort with government organizations and different countries and different species with extraterrestrial beings involved are trying to help mankind improve his condition\".      History of Mental Health and Chemical Dependency:  Last hospitalization 2014 in [place]  Alcohol Use: 5 days a week, 10-15 oz per session, red wine (2-3 glasses)  Cigarettes: 10 cigarettes a day, 22 years  Marijuana: Occasional use  Cocaine: Once  Hash: Once  Mushrooms: Twice  Ecstasy: Twice     Family Description (Constellation, Family Psychiatric History):  Upbringing: Born and raised in [place], [place] with three older brothers  Depression: brother  Bipolar: brother  Suicides: brother    Significant Life Events (Illness, Abuse, Trauma, Death):  None stated    Living Situation:  Live with Parents in [place]    Educational Background:  Two Bachelors Degrees, Film: [employer], English: [employer]    Occupational History:       Financial Status:  Supports self     Legal Issues:  None    Ethnic/Cultural Issues:  None    Spiritual Orientation:  Agnostic      Service History:  [employer]     Social Functioning (organization, interests):  Enjoys spending time with family     Current Treatment Providers are:  None     Social Service Assessment/Plan:  Patient has been " admitted for psychiatric stabilization for this acute crisis. Patient will meet with treatment team including a psychiatrist to have psychiatric assessment and medication management. Team will coordinate with the any outpatient medication providers to review and adjust medications as appropriate. Staff will provide therapeutic programming and structure to help maintain a safe environment for healing. Staff will continue to assess patient as needed. Patient will participate in unit groups and activities. Patient will receive individual and group support on the unit. CTC will coordinate with outpatient providers and will place referrals to ensure appropriate follow up care is in place.

## 2018-03-30 NOTE — PROGRESS NOTES
"INITIAL O.T. ASSESSMENT:  Grabiel has attended two OT groups since admission. Presents as quiet, somewhat guarded, tense. Stated that his \"bizarre thoughts\" are the reason for his current hospitalization, though later in the same sentence, reported confusion about \"why [he's] here.\"     Was given and completed a written self assessment. Cited \"My sharing about being followed and mass-surveiled with my family led to them being very concerned about me; they also thought my ideas about aliens being on earth were concerning\" as the reason for his current hospitalization. When asked what staff can do to be most helpful during hospitalization, pt responded \"I don't know... Would be nice to be believed\".     Goals prioritized for hospitalization (selected from list): Sleep quality, Focus and concentration    Goals prioritized for hospitalization (self-described): \"Stop smoking.\"    OT staff explained the purpose of pt being involved in current treatment plan.      Plan: Encourage ongoing attendance as able to meet self-stated goals, implement positive coping skills, engage in graded opportunities for success, and provide assessment ongoing.      "

## 2018-03-30 NOTE — PROGRESS NOTES
03/30/18 1417   Behavioral Health   Hallucinations denies / not responding to hallucinations   Thinking poor concentration;distractable   Orientation time: oriented;date: oriented;place: oriented   Insight poor   Judgement impaired   Eye Contact out of corner of eyes   Affect blunted, flat   Mood mood is calm   Physical Appearance/Attire disheveled   Suicidality other (see comments)  (Denies at this time)   1. Wish to be Dead No   2. Non-Specific Active Suicidal Thoughts  No   Self Injury (None observed)     Pt was mostly in his room during the morning. Up and visible during breakfast, and to make request. Pleasant and polite during interactions. States his goal will be to make it to a group today, which he did later in the shift. Denies thoughts to hurt self or others. Contracts for safety.

## 2018-03-31 PROCEDURE — 27210903 ZZH KIT CR5

## 2018-03-31 PROCEDURE — 27210886 ZZH ACCESSORY CR5

## 2018-03-31 PROCEDURE — 25000132 ZZH RX MED GY IP 250 OP 250 PS 637: Performed by: STUDENT IN AN ORGANIZED HEALTH CARE EDUCATION/TRAINING PROGRAM

## 2018-03-31 PROCEDURE — C1769 GUIDE WIRE: HCPCS

## 2018-03-31 PROCEDURE — 12400007 ZZH R&B MH INTERMEDIATE UMMC

## 2018-03-31 PROCEDURE — 27211039 ZZH NEEDLE CR2

## 2018-03-31 PROCEDURE — 90853 GROUP PSYCHOTHERAPY: CPT

## 2018-03-31 PROCEDURE — 27210732 ZZH ACCESSORY CR1

## 2018-03-31 RX ADMIN — THERA TABS 1 TABLET: TAB at 08:49

## 2018-03-31 RX ADMIN — RISPERIDONE 2 MG: 2 TABLET ORAL at 20:51

## 2018-03-31 ASSESSMENT — ACTIVITIES OF DAILY LIVING (ADL)
ORAL_HYGIENE: INDEPENDENT
GROOMING: INDEPENDENT
DRESS: INDEPENDENT;SCRUBS (BEHAVIORAL HEALTH)
LAUNDRY: WITH SUPERVISION

## 2018-03-31 NOTE — PROGRESS NOTES
"Pt visible and active w/ peers throughout evening. Pleasant, calm and approachable. Pt easily engaged by both peers and staff. States mood is good and that he is having no sx of depression or anxiety tonight. States that he feels safe and has no si/sib concerns. States that he is still \"feeling something in the back of [his] head\" which concerns him. Alluded to something being in his head or having been placed there, but did not explain further. Stated feeling in the back of his head felt more sharp and present after he took his medication. States that the feeling has not changed since being here.         03/30/18 2200   Behavioral Health   Hallucinations denies / not responding to hallucinations   Thinking delusional;distractable   Orientation person: oriented;place: oriented;date: oriented;time: oriented   Memory confabulation   Insight poor   Judgement impaired   Eye Contact at examiner   Affect blunted, flat   Mood mood is calm   Physical Appearance/Attire attire appropriate to age and situation;appears stated age   Hygiene (adequate)   Suicidality (denies)   1. Wish to be Dead No   2. Non-Specific Active Suicidal Thoughts  No   Self Injury (denies)   Elopement (no concerns)   Activity (visisble/active)   Speech clear;coherent   Medication Sensitivity no observed side effects;no stated side effects   Psychomotor / Gait balanced;steady   Psycho Education   Type of Intervention 1:1 intervention   Response participates, initiates socially appropriate   Hours 0.5   Treatment Detail check in   Group Therapy Session   Group Attendance attended group session   Group Type psychotherapeutic   Activities of Daily Living   Hygiene/Grooming independent   Oral Hygiene independent   Dress independent   Laundry with supervision   Room Organization independent   Activity   Activity Assistance Provided independent     "

## 2018-03-31 NOTE — PLAN OF CARE
Problem: Social/Occupational/Functional Impairment (Psychotic Signs/Symptoms) (Adult)  Goal: Improved Social/Occupational/Functional Skills (Psychotic Signs/Symptoms)  Participated in weekend OT group designed to facilitate positive social interaction, reminiscing, memory, and attention. Group activity required tracking and taking turns, remembering the pieces, sharing memories or ideas, and listening to and commenting on others  contributions. Pt demonstrated appropriate social and listening skills. Required 1 reminder for the sequences of the game, but otherwise followed along and tracked turns. Pt contributed thoughtful answers that were articulate. Pt presented with a calm affect. Pt reported enjoying the activity.

## 2018-03-31 NOTE — PROGRESS NOTES
----------------------------------------------------------------------------------------------------------  [facility], [place]   Psychiatric Progress Note         Contacts:   Primary Outpatient Psychiatrist: None  Primary Physician: Dr. Gwyn Chadwick [facility]  Therapist: None  [facility] CM: None  Probation/: None  Family: Mikel Woodall [phone]/ parents : [phone]     Assessment    Presentation: Juan Daniel Woodall is a 42 year old  male with a significant past psychiatric history of  bipolar disorder who presents with concerns raised by his family of behavioral concerns that are similar to previous episode in 2014. (Paranoid delusions of being followed and , delusion of being used by extraterrestrial beings to help human kind, seeing shadows )    Diagnostic Impression: Juan Daniel Woodall is a 42 year old male with a history of bipolar disorder who presented to the [facility] following concerning behaviors and significant delusions in the context of increased work stress. The patient's last psychiatric hospitalization was in [place], [place] in 2014.  The patient is not currently followed by a psychiatrist. Family history is notable for bipolar disorder, depression and suicide. Current psychosocial stressors include increased work hours, belief that he is being followed, concerns for hidden presence of extraterrestrials. The patient denies recent drug abuse or self injurious behaviors. The MSE is notable for pleasant gentleman who tends to ramble at times with significant delusions, paranoia. The patient's reported symptoms of delusions, paranoia, thought broadcasting, suggestion that he has special carver, possible auditory/visual hallucinations, anhedonia are consistent with diagnosis of schizophreniform disorder, and if viewed in the context of similar episode in 2014 may meet criteria for schizophrenia at this time. Of note, on exam patient does not appear manic  or depressed, though collateral from family may be helpful in confirming his mood state and behaviors prior to admission.    Hospital course: Juan Daniel Woodall was admitted to station 22 as a voluntary patient, under the care of Dr. Kam.[facility] med. Patient's admission labs were reviewed and were unremarkable.     Thus far in this admission, there are the following changes to the medication management :  - Risperidone: Started at 2mg at night for sleep and psychotic sxs.     Medical course : None.     Plan     - Patient will be treated in therapeutic milieu with appropriate individual and group therapies as described.  - Legal Status: Voluntary  - Safety Assessment:    - Checks: Status 15   - Precautions: None    Principal Diagnosis:   # Schizophrenia vs Schizoaffective vs Bipolar disorder w/ psychotic features    Medications:    Medication Changes:                     - Start Risperidone   Continue:                   - Olanzapine 10 mg IM/PO for psychiatric emergencies                  - Hydroxyzine 25-50 mg PO for anxiety    Laboratory/Imaging/tests:   - Admission labs including CBC, CMP, TSH with T4, UDS, Vitamin B12 levels , folate level, Vitamin D level were reviewed. Unremarkable.     Consults: None    Secondary psychiatric diagnoses of concern this admission:   # R/O Substance use disorder ( Cannabis And Alcohol)    # Tobacco use disorder  - Nicotine replacement products provided    Medical diagnoses to be addressed this admission:    # None    The risks, benefits, alternatives and side effects have been discussed and are understood by the patient and/or other caregivers present at the time of the admission.     Disposition:  TBD pending clinical stabilization and establishment of a safe discharge plan.  - Anticipated Disposition/Discharge Date: TBD    Attestation:  Pt seen and discussed with my attending, Dr. Lamb.   Kena Qureshi MD  PGY2 Psychiatry Resident  Pager: [phone]    Attestation:  I,  "Jhnoy Lamb, have personally performed an examination of this patient and I have reviewed the resident's documentation.  I have edited the note to reflect all relevant changes.  I have discussed this patient with the house staff on 3/30/2018.  I agree with resident findings and plan in today's note and yesterdays resident H&P.  I have reviewed all vitals and laboratory findings.      I certifiy that the inpatient services were ordered in accordance with the Medicare regulations governing the order. This includes certification that hospital inpatient services are reasonable and necessary and in the case of services not specified as inpatient-only under 42 .22(n), that they are appropriately provided as inpatient services in accordance with the 2-midnight benchmark under 42 .3(e).     The reason for inpatient status is Acute Psychosis.    Jhony Lamb MD       Interim History:   The patient's care was discussed with the treatment team and chart including vitals, medication list and staff notes were reviewed.     Hospital days: 1  Sleep: Night Time # Hours: 6 hours     PRN medication list: none     Pt has not required locked seclusion or restraints in the past 24 hours to maintain safety, please refer to RN documentation for further details.    Juan Daniel Woodall was interviewed at conference room. He seemed to be suspicious of the new environment and he was checking his back and phones and monitors in the room throughout the interview. 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 He is under the impression that \"They\" want to reach him to breaking point so he becomes numb about their presence and he will not get into their way. \"They\" also might benefit from his presence in the [employer] to implement their plans. He has shared this idea with few " "coworkers who either teased on him or they also shared their believed in this regard, including the hidden passage at managers room. He checked on that passage and confirm its presence when no one was around. 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 Of note, he has not climbed a tree since childhood and at baseline he would have even be embarrassed by running at public. He reports a significant 80 lbs weight loss since 2 years ago which was partially intentional by eating well and exercising. He follows \"towel whites\" series in the TV which is a documentary about the aliens on earth. His female coworker fits the characteristics of aliens reported at this documentary because\" she works fast and said she can beat the shit out of me\".     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 He got promoted to  of [employer] and had three direct report personnel. Job stress in [place] was too much and he started to get paranoid about his working environment. He felt his phone was tapped and that people are following him. It was the time that first hospitalization occurred. He stabilized in the hospital and continued to take his medications for about 3 years until he felt like the medications are not helping. He be;ieves that his sxs today are at continuation of what he experienced then and that he does not feel this is an illness and is very fixated on " "delusions. He agreed to stay longer because he is  distressed (not as much as 8 years ago when he started to experience what he does today\"kind of got used to it\"). After that hospitalization he continued for working for [employer] at [email or web address] (while he was demoted but received the same salary). Demotion was not based on performance but was based on opening position description.     Today he feels being \"scanned or read\" , feels funny pressure at his head and interprets this as telepathy. He does not think thoughts are being inserted but he is very convinced that communications from TV/RADIO are directed to him at times. He brings up several incidents when he was doing something (falling, buying hit dogs or thinking of a subject) that was brought up in the TV or radio.     He reports trying Meth, mushroom, ecstasy once or twice in life but Cannabis use and Alcohol use has been constant and frequent. Cannabis use started in highschool and in the last year he has been using 8-10 times a month ( 6-7 puffs if pipe/or uses oils). His alcohol drink has slowly and steadily increased  through his life. No H/o w/d sxs or seizure.     He is in agreement that his stress level related to these experiences and asks for help. Medication was discussed. R/B/SE/A and outcome of non treatment was discussed. Patient in agreement to take Risperidone mainly to address sleep.     The patient requires inpatient hospitalization due to psychotic sxs.    Review of systems:     The 10 point Review of Systems is negative other than noted in above note.          Medications:     Current Facility-Administered Medications   Medication     risperiDONE (risperDAL) tablet 2 mg     diphenhydrAMINE (BENADRYL) 25 mg, acetaminophen (TYLENOL) 325 mg alternative for Tylenol PM     multivitamin, therapeutic (THERA-VIT) tablet 1 tablet     hydrOXYzine (ATARAX) tablet 25 mg     acetaminophen (TYLENOL) tablet 650 mg     alum & mag " "hydroxide-simethicone (MYLANTA ES/MAALOX  ES) suspension 30 mL     traZODone (DESYREL) tablet 50 mg     OLANZapine (zyPREXA) tablet 10 mg    Or     OLANZapine (zyPREXA) injection 10 mg     nicotine polacrilex (COMMIT) lozenge 2-4 mg     magnesium hydroxide (MILK OF MAGNESIA) suspension 30 mL             Allergies:   No Known Allergies         Psychiatric Examination:   Weight is 163 lbs 8 oz  Body mass index is 20.99 kg/(m^2).  Last Vitals: /78  Pulse 71  Temp 96.9  F (36.1  C) (Tympanic)  Resp 16  Ht 1.88 m (6' 2\")  Wt 74.2 kg (163 lb 8 oz)  SpO2 100%  BMI 20.99 kg/m2    Appearance:  awake, alert, adequately groomed and appeared as age stated  Attitude:  cooperative  Eye Contact:  good, intense, looking around room  Mood:  okay  Affect:  appropriate and in normal range, mood congruent, intensity is blunted, constricted mobility, full range and reactive  Speech:  clear, coherent  Psychomotor Behavior:  no evidence of tardive dyskinesia, dystonia, or tics and intact station, gait and muscle tone  Thought Process:  linear, goal oriented and illogical  Associations:  no loose associations  Thought Content:  no evidence of suicidal ideation or homicidal ideation, no auditory hallucinations present and no visual hallucinations present, paranoid delusions and delusions of being a part of extraterrestrial life, thought broadcast and thought insertion.   Insight:  limited  Judgment:  fair  Oriented to:  time, person, and place  Attention Span and Concentration:  fair  Recent and Remote Memory:  intact  Language: Able to name objects, Able to repeat phrases and Able to read and write  Fund of Knowledge: appropriate  Muscle Strength and Tone: normal  Gait and Station: Normal         Labs:   No results found for this or any previous visit (from the past 24 hour(s)).    Antipsychotic Labs:  Recent Labs   Lab Test  03/30/18   0800   CHOL  145   TRIG  57   LDL  48   HDL  86     Recent Labs   Lab Test  03/29/18   1324 "   GLC  136*     Recent Labs   Lab Test  03/29/18   1324   WBC  6.0   ANEU  4.1   HGB  14.1   PLT  260

## 2018-03-31 NOTE — PROGRESS NOTES
Acclimating to unit. Appetite good. Offers no complaints. Keeping to self.         03/31/18 1456   Behavioral Health   Hallucinations denies / not responding to hallucinations   Thinking distractable;delusional   Orientation person: oriented;place: oriented;situation, disoriented   Insight poor   Judgement impaired   Affect (Ruth.)   Physical Appearance/Attire attire appropriate to age and situation;neat   Hygiene well groomed   1. Wish to be Dead No   2. Non-Specific Active Suicidal Thoughts  No   Activity restless   Speech coherent;clear   Safety   Suicidality Status 15

## 2018-04-01 PROCEDURE — 25000132 ZZH RX MED GY IP 250 OP 250 PS 637: Performed by: STUDENT IN AN ORGANIZED HEALTH CARE EDUCATION/TRAINING PROGRAM

## 2018-04-01 PROCEDURE — 12400007 ZZH R&B MH INTERMEDIATE UMMC

## 2018-04-01 RX ADMIN — THERA TABS 1 TABLET: TAB at 08:19

## 2018-04-01 RX ADMIN — RISPERIDONE 2 MG: 2 TABLET ORAL at 20:52

## 2018-04-01 NOTE — PROGRESS NOTES
Pt much less visible/active on unit than previous evening. Spent majority of time in room and was withdrawn from peers when out. Pt states that he is doing fine and offers no complaints. States that he does not have any depression/anxiety sx and when asked about the sensation in the back of his head that has been bothering him, pt states that it isn't there tonight. Pt states that medication must be taking care of it; however pt does not appear convincing in in making these statements. Pt appears preoccupied but maintains that he's just tired. Pt states he feels safe, no si/sib         03/31/18 2100   Behavioral Health   Hallucinations denies / not responding to hallucinations   Thinking distractable;delusional   Orientation person: oriented;place: oriented   Memory baseline memory;confabulation   Insight poor   Judgement impaired   Eye Contact at examiner   Affect blunted, flat   Mood mood is calm   Physical Appearance/Attire attire appropriate to age and situation;neat   Hygiene well groomed   Suicidality (denies)   1. Wish to be Dead No   2. Non-Specific Active Suicidal Thoughts  No   Self Injury (denies)   Elopement (no concerns)   Activity withdrawn   Speech clear;coherent   Medication Sensitivity no stated side effects;no observed side effects   Psychomotor / Gait balanced;steady   Psycho Education   Type of Intervention 1:1 intervention   Response participates, initiates socially appropriate   Hours 0.5   Treatment Detail chck in   Daily Care   Patient Performed Hygiene shower   Activities of Daily Living   Hygiene/Grooming independent   Oral Hygiene independent   Dress independent;scrubs (behavioral health)   Laundry with supervision   Room Organization independent   Activity   Activity Assistance Provided independent

## 2018-04-02 PROCEDURE — 90853 GROUP PSYCHOTHERAPY: CPT

## 2018-04-02 PROCEDURE — 12400007 ZZH R&B MH INTERMEDIATE UMMC

## 2018-04-02 PROCEDURE — 99232 SBSQ HOSP IP/OBS MODERATE 35: CPT | Mod: GC | Performed by: PSYCHIATRY & NEUROLOGY

## 2018-04-02 PROCEDURE — 25000132 ZZH RX MED GY IP 250 OP 250 PS 637: Performed by: STUDENT IN AN ORGANIZED HEALTH CARE EDUCATION/TRAINING PROGRAM

## 2018-04-02 PROCEDURE — 97150 GROUP THERAPEUTIC PROCEDURES: CPT | Mod: GO

## 2018-04-02 RX ORDER — RISPERIDONE 3 MG/1
3 TABLET ORAL AT BEDTIME
Status: DISCONTINUED | OUTPATIENT
Start: 2018-04-02 | End: 2018-04-03

## 2018-04-02 RX ADMIN — RISPERIDONE 3 MG: 3 TABLET ORAL at 22:23

## 2018-04-02 ASSESSMENT — ACTIVITIES OF DAILY LIVING (ADL)
DRESS: SCRUBS (BEHAVIORAL HEALTH);INDEPENDENT
ORAL_HYGIENE: INDEPENDENT
GROOMING: INDEPENDENT
LAUNDRY: WITH SUPERVISION

## 2018-04-02 NOTE — PLAN OF CARE
Problem: Mental State/Mood Impairment (Psychotic Signs/Symptoms) (Adult)  Goal: Improved Mental State/Mood (Psychotic Signs/Symptoms)  Outcome: Improving    Patient slightly more animated and social this shift, but superficially so.   Pt pleasant on approach, but appears slightly guarded.

## 2018-04-02 NOTE — PROGRESS NOTES
----------------------------------------------------------------------------------------------------------  [facility], [place]   Psychiatric Progress Note         Contacts:   Primary Outpatient Psychiatrist: None  Primary Physician: Dr. Gwyn Chadwick [facility]  Therapist: None  [facility] CM: None  Probation/: None  Family: Mikel Woodall [phone]/ parents : [phone]     Assessment    Presentation: Juan Daniel Woodall is a 42 year old  male with a significant past psychiatric history of bipolar disorder who presents with concerns raised by his family of behavioral concerns that are similar to previous episode in 2014. (Paranoid delusions of being followed and , delusion of being used by extraterrestrial beings to help human kind, seeing shadows).    Diagnostic Impression: Juan Daniel Woodall is a 42 year old male with a history of bipolar disorder who presented to the [facility] following concerning behaviors and significant delusions in the context of increased work stress. The patient's last psychiatric hospitalization was in [place], [place] in 2014.  The patient is not currently followed by a psychiatrist. Family history is notable for bipolar disorder, depression and suicide. Current psychosocial stressors include increased work hours, belief that he is being followed, concerns for hidden presence of extraterrestrials. The patient denies recent drug abuse or self injurious behaviors. The MSE is notable for pleasant gentleman who tends to ramble at times with significant delusions, paranoia. The patient's reported symptoms of delusions, paranoia, thought broadcasting, suggestion that he has special carver, possible auditory/visual hallucinations, anhedonia are consistent with diagnosis of schizophreniform disorder, and if viewed in the context of similar episode in 2014 may meet criteria for schizophrenia at this time. Of note, on exam patient does not appear manic  or depressed, though collateral from family may be helpful in confirming his mood state and behaviors prior to admission.    Hospital course: Juan Daniel Woodall was admitted to station 22 as a voluntary patient, under the care of Dr. Kam. No PTA meds. Patient's admission labs were reviewed and were unremarkable.  On 3/30 patient agreed to begin risperidone at 2 mg qHS.  After a weekend at this dose, on 4/2 endorsed no untoward effects and agreed to increase to 3 mg qHS with goal dose of 4 mg.    Medical course : None.     Plan     - Patient will be treated in therapeutic milieu with appropriate individual and group therapies as described.  - Legal Status: Voluntary  - Safety Assessment:    - Checks: Status 15   - Precautions: None    Principal Diagnosis:   # Schizophrenia vs Schizoaffective vs Bipolar disorder w/ psychotic features    Medications:    Medication Changes:                     - Increase Risperidone to 3 mg QHS   Continue:                   - Olanzapine 10 mg IM/PO for psychiatric emergencies                  - Hydroxyzine 25-50 mg PO for anxiety    Laboratory/Imaging/tests:   - Admission labs including CBC, CMP, TSH with T4, UDS, Vitamin B12 levels , folate level, Vitamin D level were reviewed. Unremarkable.     Consults: None    Secondary psychiatric diagnoses of concern this admission:   # R/O Substance use disorder ( Cannabis And Alcohol)    # Tobacco use disorder  - Nicotine replacement products provided    Medical diagnoses to be addressed this admission:    # None    The risks, benefits, alternatives and side effects have been discussed and are understood by the patient and/or other caregivers present at the time of the admission.     Disposition:  TBD pending clinical stabilization and establishment of a safe discharge plan.    Patient has been seen and evaluated by Mario cummings DO, Psychiatry Resident, PGY2.    Mario Echeverria DO  Resident Psychiatrist, PGY-2  [facility] Psychiatry    Attestation:  This  "patient has been seen and evaluated by me, Jhony Lamb.  I have discussed this patient with the house staff team including the resident and medical student and I agree with the findings and plan in this note.    I have reviewed today's vital signs, medications, labs and imaging. Jhony Lamb MD     Interim History:   The patient's care was discussed with the treatment team and chart including vitals, medication list and staff notes were reviewed.     Sleep: 6.75 hrs  PRN medications: None    Staff Report: Calm and approachable.  On Saturday noted that he was still feeling something at the back of his neck which he assumed was someone reading his mind telepathically.  Was somewhat social in the milieu that day, but on Sunday spent the day in his room.  Was noted to appear preoccupied and guarded but stated that he is \"just tired.\"    Interview:  Patient was interviewed in the conference room this AM.  Was polite and cooperative.  Appeared mildly guarded, but did not seem hesitant to answer questions.  Stated that he spent the weekend catching up on sleep and engaging in OT groups, which he enjoyed.  Denied negative side effects from risperidone, specifically denying muscle tension, headaches, constipation.  Denied that he had been emotionally overwhelmed right prior to admission, but said that he HAD been feeling this way about a week and a half ago.  Endorsed eating well and exercising and that his anxiety is low.    When asked how he feels about being on the unit, he stated that he has been fine here, and then, after hesitating, stated his assessment that some of the other patients \"are not actually real psychiatric patients.\"  Elaborated that they are plants whose real intention is to monitor him and other patients, and that he knows this due to experiences he's had in the past where \"people haven't really been who they pretended to be.\"  Named two patients in particular who he stated he feels are not actually " "mentally ill because \"there doesn't seem to be anything wrong\" with them.    As the meeting ended, he agreed to let us speak with his parents, as long a we didn't repeat any of the things that he has been telling us.  Also agreed to increase his risperidone to 3 mg qHS.  Smiled and shook hands with the team as he left the room.    Review of systems:     The 10 point Review of Systems is negative other than noted in above note.          Medications:     Current Facility-Administered Medications   Medication     risperiDONE (risperDAL) tablet 2 mg     diphenhydrAMINE (BENADRYL) 25 mg, acetaminophen (TYLENOL) 325 mg alternative for Tylenol PM     multivitamin, therapeutic (THERA-VIT) tablet 1 tablet     hydrOXYzine (ATARAX) tablet 25 mg     acetaminophen (TYLENOL) tablet 650 mg     alum & mag hydroxide-simethicone (MYLANTA ES/MAALOX  ES) suspension 30 mL     traZODone (DESYREL) tablet 50 mg     OLANZapine (zyPREXA) tablet 10 mg    Or     OLANZapine (zyPREXA) injection 10 mg     nicotine polacrilex (COMMIT) lozenge 2-4 mg     magnesium hydroxide (MILK OF MAGNESIA) suspension 30 mL             Allergies:   No Known Allergies         Psychiatric Examination:   Weight is 167 lbs 0 oz  Body mass index is 21.44 kg/(m^2).  Last Vitals: /78  Pulse 71  Temp 97.3  F (36.3  C)  Resp 14  Ht 1.88 m (6' 2\")  Wt 75.8 kg (167 lb)  SpO2 100%  BMI 21.44 kg/m2    Appearance:  awake, alert, adequately groomed and appeared as age stated  Attitude:  cooperative  Eye Contact:  good, intense, looking around room  Mood:  okay  Affect:  appropriate and in normal range, mood congruent, intensity is blunted, constricted mobility, full range and reactive  Speech:  clear, coherent  Psychomotor Behavior:  no evidence of tardive dyskinesia, dystonia, or tics and intact station, gait and muscle tone  Thought Process:  linear, goal oriented and illogical  Associations:  no loose associations  Thought Content:  no evidence of suicidal " ideation or homicidal ideation, no auditory hallucinations present and no visual hallucinations present, paranoid delusions and thinking that other patients are imposters  Insight:  limited  Judgment:  fair to good  Oriented to:  time, person, and place  Attention Span and Concentration:  fair  Recent and Remote Memory:  intact  Language: fluent English  Fund of Knowledge: appropriate  Muscle Strength and Tone: normal  Gait and Station: Normal         Labs:     Results for orders placed or performed during the hospital encounter of 03/29/18   Drug abuse screen 6 urine (chem dep) ([facility])   Result Value Ref Range    Amphetamine Qual Urine Negative NEG^Negative    Barbiturates Qual Urine Negative NEG^Negative    Benzodiazepine Qual Urine Negative NEG^Negative    Cannabinoids Qual Urine Negative NEG^Negative    Cocaine Qual Urine Negative NEG^Negative    Ethanol Qual Urine Negative NEG^Negative    Opiates Qualitative Urine Negative NEG^Negative   UA reflex to Microscopic and Culture   Result Value Ref Range    Color Urine Light Yellow     Appearance Urine Clear     Glucose Urine Negative NEG^Negative mg/dL    Bilirubin Urine Negative NEG^Negative    Ketones Urine Negative NEG^Negative mg/dL    Specific Gravity Urine 1.012 1.003 - 1.035    Blood Urine Negative NEG^Negative    pH Urine 5.0 5.0 - 7.0 pH    Protein Albumin Urine Negative NEG^Negative mg/dL    Urobilinogen mg/dL Normal 0.0 - 2.0 mg/dL    Nitrite Urine Negative NEG^Negative    Leukocyte Esterase Urine Negative NEG^Negative    Source Unspecified Urine    TSH with free T4 reflex   Result Value Ref Range    TSH 1.42 0.40 - 4.00 mU/L   Basic metabolic panel   Result Value Ref Range    Sodium 142 133 - 144 mmol/L    Potassium 3.7 3.4 - 5.3 mmol/L    Chloride 107 94 - 109 mmol/L    Carbon Dioxide 26 20 - 32 mmol/L    Anion Gap 9 3 - 14 mmol/L    Glucose 136 (H) 70 - 99 mg/dL    Urea Nitrogen 14 7 - 30 mg/dL    Creatinine 0.75 0.66 - 1.25 mg/dL    GFR Estimate >90  >60 mL/min/1.7m2    GFR Estimate If Black >90 >60 mL/min/1.7m2    Calcium 8.8 8.5 - 10.1 mg/dL   CBC with platelets differential   Result Value Ref Range    WBC 6.0 4.0 - 11.0 10e9/L    RBC Count 4.67 4.4 - 5.9 10e12/L    Hemoglobin 14.1 13.3 - 17.7 g/dL    Hematocrit 42.6 40.0 - 53.0 %    MCV 91 78 - 100 fl    MCH 30.2 26.5 - 33.0 pg    MCHC 33.1 31.5 - 36.5 g/dL    RDW 12.8 10.0 - 15.0 %    Platelet Count 260 150 - 450 10e9/L    Diff Method Automated Method     % Neutrophils 68.2 %    % Lymphocytes 26.0 %    % Monocytes 4.5 %    % Eosinophils 0.8 %    % Basophils 0.3 %    % Immature Granulocytes 0.2 %    Nucleated RBCs 0 0 /100    Absolute Neutrophil 4.1 1.6 - 8.3 10e9/L    Absolute Lymphocytes 1.6 0.8 - 5.3 10e9/L    Absolute Monocytes 0.3 0.0 - 1.3 10e9/L    Absolute Eosinophils 0.1 0.0 - 0.7 10e9/L    Absolute Basophils 0.0 0.0 - 0.2 10e9/L    Abs Immature Granulocytes 0.0 0 - 0.4 10e9/L    Absolute Nucleated RBC 0.0    Lipid panel   Result Value Ref Range    Cholesterol 145 <200 mg/dL    Triglycerides 57 <150 mg/dL    HDL Cholesterol 86 >39 mg/dL    LDL Cholesterol Calculated 48 <100 mg/dL    Non HDL Cholesterol 59 <130 mg/dL   Hepatic panel   Result Value Ref Range    Bilirubin Direct 0.1 0.0 - 0.2 mg/dL    Bilirubin Total 0.6 0.2 - 1.3 mg/dL    Albumin 3.5 3.4 - 5.0 g/dL    Protein Total 6.5 (L) 6.8 - 8.8 g/dL    Alkaline Phosphatase 52 40 - 150 U/L    ALT 32 0 - 70 U/L    AST 21 0 - 45 U/L   Folate   Result Value Ref Range    Folate 25.7 >5.4 ng/mL   Hematocrit   Result Value Ref Range    Hematocrit 42.6 40.0 - 53.0 %   Vitamin B12   Result Value Ref Range    Vitamin B12 703 193 - 986 pg/mL     Antipsychotic Labs:  Recent Labs   Lab Test  03/30/18   0800   CHOL  145   TRIG  57   LDL  48   HDL  86     Recent Labs   Lab Test  03/29/18   1324   GLC  136*     Recent Labs   Lab Test  03/29/18   1324   WBC  6.0   ANEU  4.1   HGB  14.1   PLT  260

## 2018-04-02 NOTE — PROGRESS NOTES
Attended 1.0 Life Skills Activity Group designed to facilitate interaction with others,spontaneity, enjoyment and maximize cognitive skills of developing strategy, problem solving, enhancing memory as well as provide resources for ongoing use. Actively participated appropriately in group. Pt participated in 1.0 (was called out for some of 1.50 group) OT clinic and was able to initiate task, social, organized.

## 2018-04-02 NOTE — PROGRESS NOTES
Pt visible and active w/in milieu throughout evening. Calm, polite, socially appropriate when engaged with peers and staff. Thinking appears to remain somewhat distorted based on overheard conversations; however there appears to be some improvement over previous encounters. Pt states that he feels that the medication is helping to clear up his thinking and improve his focus and states that if that's what it takes to help him that he's happy to be taking medication. States mood as otherwise good. Denies depression/anxiety sx. Denies safety concerns.           04/02/18 1800   Behavioral Health   Hallucinations denies / not responding to hallucinations   Thinking distractable   Orientation person: oriented;place: oriented;date: oriented;time: oriented   Memory baseline memory   Insight poor   Judgement impaired   Eye Contact at examiner   Affect full range affect   Mood mood is calm   Physical Appearance/Attire attire appropriate to age and situation;neat   Hygiene well groomed   Suicidality (denies)   1. Wish to be Dead No   2. Non-Specific Active Suicidal Thoughts  No   Self Injury (denies)   Elopement (no concerns)   Activity (visisble/active)   Speech clear;coherent   Medication Sensitivity no stated side effects;no observed side effects   Psychomotor / Gait balanced;steady   Psycho Education   Type of Intervention 1:1 intervention   Response participates, initiates socially appropriate   Hours 0.5   Treatment Detail check in   Group Therapy Session   Group Attendance attended group session   Group Type community   Activities of Daily Living   Hygiene/Grooming independent   Oral Hygiene independent   Dress scrubs (behavioral health);independent   Laundry with supervision   Room Organization independent   Activity   Activity Assistance Provided independent

## 2018-04-02 NOTE — PROGRESS NOTES
"   04/02/18 1207   Behavioral Health   Hallucinations denies / not responding to hallucinations   Thinking distractable   Orientation time: oriented;date: oriented;place: oriented;person: oriented   Insight poor   Judgement impaired   Eye Contact at examiner   Affect other (see comments)  (Neutral)   Mood mood is calm   Physical Appearance/Attire attire appropriate to age and situation   Hygiene well groomed   Suicidality other (see comments)  (Denies at this time)   1. Wish to be Dead No   2. Non-Specific Active Suicidal Thoughts  No   Self Injury other (see comment)  (Denies at this time)   Elopement (None observed ot reported)   Activity other (see comment)  (Visible on the unit, and participates in groups)   Speech clear   Medication Sensitivity no stated side effects   Psychomotor / Gait balanced     Pt was up and visible on the unit. Affect has been neutral. Pt describes his mood as, \"stable.\" Calm and controlled. During 1:1 pt described how he still feels guilty, because \"when I was 29, I let my pet fish kill another fish that was in the tank.\" Says he thinks about this more since his admission. His goal is to continue to , and keep himself busy. Observed to attend community meeting, and participated in OT groups. Denies thoughts to hurt himself or others. Contracts for safety.   "

## 2018-04-03 PROCEDURE — 25000132 ZZH RX MED GY IP 250 OP 250 PS 637: Performed by: STUDENT IN AN ORGANIZED HEALTH CARE EDUCATION/TRAINING PROGRAM

## 2018-04-03 PROCEDURE — 97150 GROUP THERAPEUTIC PROCEDURES: CPT | Mod: GO

## 2018-04-03 PROCEDURE — 90853 GROUP PSYCHOTHERAPY: CPT

## 2018-04-03 PROCEDURE — 12400007 ZZH R&B MH INTERMEDIATE UMMC

## 2018-04-03 PROCEDURE — 99232 SBSQ HOSP IP/OBS MODERATE 35: CPT | Mod: GC | Performed by: PSYCHIATRY & NEUROLOGY

## 2018-04-03 RX ORDER — RISPERIDONE 4 MG/1
4 TABLET ORAL AT BEDTIME
Status: DISCONTINUED | OUTPATIENT
Start: 2018-04-03 | End: 2018-04-04 | Stop reason: HOSPADM

## 2018-04-03 RX ORDER — RISPERIDONE 4 MG/1
4 TABLET ORAL AT BEDTIME
Qty: 30 TABLET | Refills: 0 | Status: SHIPPED | OUTPATIENT
Start: 2018-04-03

## 2018-04-03 RX ADMIN — THERA TABS 1 TABLET: TAB at 09:00

## 2018-04-03 RX ADMIN — RISPERIDONE 4 MG: 4 TABLET ORAL at 22:03

## 2018-04-03 ASSESSMENT — ACTIVITIES OF DAILY LIVING (ADL)
ORAL_HYGIENE: INDEPENDENT
DRESS: INDEPENDENT;SCRUBS (BEHAVIORAL HEALTH)
HYGIENE/GROOMING: INDEPENDENT
LAUNDRY: WITH SUPERVISION

## 2018-04-03 NOTE — DISCHARGE INSTRUCTIONS
Behavioral Discharge Planning and Instructions      Summary:  You were admitted on 3/29/2018  due to Psychotic Symptomology.  You were treated by Dr. Jhony Lamb MD and discharged on 4/3/18 from [facility] to Home with follow up in place.       Principal Diagnosis: Schizophrenia vs Schizoaffective vs Bipolar disorder w/ psychotic features      Health Care Follow-up Appointments:     Sally Rios NP- Friday April 13th 10am (Medication Management)  [facility]  [address] [place] [address], [address]  [place] [address]  Phone: [phone] Fax: [phone]    Attend all scheduled appointments with your outpatient providers. Call at least 24 hours in advance if you need to reschedule an appointment to ensure continued access to your outpatient providers.   Major Treatments, Procedures and Findings:  You were provided with: a psychiatric assessment, medication evaluation and/or management, group therapy and milieu management    Symptoms to Report: increased confusion, losing more sleep or mood getting worse    Early warning signs can include: increased depression or anxiety increased thoughts or behaviors of suicide or self-harm  increased unusual thinking, such as paranoia or hearing voices    Safety and Wellness:  Take all medicines as directed.  Make no changes unless your doctor suggests them.      Follow treatment recommendations.  Refrain from alcohol and non-prescribed drugs.  If there is a concern for safety, call 911.    Resources:   Crisis Intervention: 412.755.2738 or 008-380-2616 (TTY: 625.929.3994).  Call anytime for help.  National [place] on Mental Illness (www.[place].org): 855.436.9616 or 855-728-4797.  Suicide Awareness Voices of Education (SAVE) (www.save.org): 075-302-UQAN (7283)  National Suicide Prevention Line (www.mentalhealthmn.org): 965-571-FCWJ (3360)  Mental Health Consumer/Survivor Network of [place] (www.mhcsn.net): 766.565.5492 or 717-481-5136  Mental Health  "Association of [place] (www.mentalhealth.org): 949.537.4171 or 275-262-9898  Self- Management and Recovery Training., SMART-- Toll free: 654.186.4689  www.[employer]  [facility] Crisis Response 248-386-0318  Text 4 Life: txt \"LIFE\" to 13462 for immediate support and crisis intervention  Crisis text line: Text \"START\" to 531-612. Free, confidential, 24/7.  Crisis Intervention: 425.475.5169 or 927-443-1714. Call anytime for help.     The treatment team has appreciated the opportunity to work with you.     If you have any questions or concerns our unit number is [phone]  You may be receiving a follow-up phone call within the next three days from a representative from behavioral health.          "

## 2018-04-03 NOTE — PROGRESS NOTES
Isolative mostly to himself, although affect brightens when engaged. Pleasant and polite. Blunted affect, depressed mood. Ate meals. Showered       04/03/18 1100   Behavioral Health   Hallucinations denies / not responding to hallucinations   Thinking distractable   Orientation person: oriented;place: oriented;date: oriented;time: oriented   Memory baseline memory   Insight poor   Judgement impaired   Eye Contact at examiner   Affect blunted, flat   Mood mood is calm   Physical Appearance/Attire attire appropriate to age and situation   Hygiene well groomed   1. Wish to be Dead No   2. Non-Specific Active Suicidal Thoughts  No   Speech clear;coherent   Psychomotor / Gait balanced;steady   Psycho Education   Type of Intervention structured groups   Response participates, initiates socially appropriate   Hours 0.5   Treatment Detail Wellness strategies   Activities of Daily Living   Hygiene/Grooming independent   Oral Hygiene independent   Dress independent;scrubs (behavioral health)   Laundry with supervision   Room Organization independent

## 2018-04-03 NOTE — PROGRESS NOTES
----------------------------------------------------------------------------------------------------------  [facility], [place]   Psychiatric Progress Note         Contacts:   Primary Outpatient Psychiatrist: None  Primary Physician: Dr. Gwyn Chadwick [facility]  Therapist: None  [facility] CM: None  Probation/: None  Family: Mikel Woodall [phone]/ parents : [phone]     Assessment    Presentation: Juan Daniel Woodall is a 42 year old  male with a significant past psychiatric history of bipolar disorder who presents with concerns raised by his family of behavioral concerns that are similar to previous episode in 2014. (Paranoid delusions of being followed and , delusion of being used by extraterrestrial beings to help human kind, seeing shadows).    Diagnostic Impression: Juan Daniel Woodall is a 42 year old male with a history of bipolar disorder who presented to the [facility] following concerning behaviors and significant delusions in the context of increased work stress. The patient's last psychiatric hospitalization was in [place], [place] in 2014.  The patient is not currently followed by a psychiatrist. Family history is notable for bipolar disorder, depression and suicide. Current psychosocial stressors include increased work hours, belief that he is being followed, concerns for hidden presence of extraterrestrials. The patient denies recent drug abuse or self injurious behaviors. The MSE is notable for pleasant gentleman who tends to ramble at times with significant delusions, paranoia. The patient's reported symptoms of delusions, paranoia, thought broadcasting, suggestion that he has special carver, possible auditory/visual hallucinations, anhedonia are consistent with diagnosis of schizophreniform disorder, and if viewed in the context of similar episode in 2014 may meet criteria for schizophrenia at this time.    Hospital course: Juan Daniel Woodall was admitted to  station 22 as a voluntary patient, under the care of Dr. Kam. No PTA meds. Patient's admission labs were reviewed and were unremarkable.  On 3/30 patient agreed to begin risperidone at 2 mg qHS.  After a weekend at this dose, on 4/2 endorsed no untoward effects and agreed to increase to 3 mg qHS with goal dose of 4 mg. Goal dose reached 4/3.  Also on that date spoke to patient's family who stated that he had been off medications for approximately one year after choosing to self-discontinue pharmacotherapy.  Overall they expressed concerns regarding his delusions but stated he performs well at his job and expressed no safety concerns.    Medical course: None.     Plan     - Patient will be treated in therapeutic milieu with appropriate individual and group therapies as described.  - Legal Status: Voluntary  - Safety Assessment:    - Checks: Status 15   - Precautions: None    Principal Diagnosis:   # Schizophrenia vs Schizoaffective vs Bipolar disorder w/ psychotic features    Medications:    Medication Changes:                     - Increase Risperidone to 4 mg QHS   Continue:                   - Olanzapine 10 mg IM/PO for psychiatric emergencies                  - Hydroxyzine 25-50 mg PO for anxiety    Laboratory/Imaging/tests:   - Admission labs including CBC, CMP, TSH with T4, UDS, Vitamin B12 levels , folate level, Vitamin D level were reviewed. Unremarkable.     Consults: None    Secondary psychiatric diagnoses of concern this admission:   # R/O Substance use disorder (Cannabis And Alcohol)  -Does not appear to be a concern-patient and patient's parents deny abuse of alcohol or drugs.    # Tobacco use disorder  - Nicotine replacement products provided    Medical diagnoses to be addressed this admission:    # None    The risks, benefits, alternatives and side effects have been discussed and are understood by the patient and/or other caregivers present at the time of the admission.     Disposition:  TBD pending  "clinical stabilization and establishment of a safe discharge plan.    I, Cristina Grierbelem, MS3, acted as a scribe for Dr. Mario Echeverria, PGY2.     Attestation:    I, Mario Echeverria DO, have reviewed and edited the documentation recorded by the scribe.  This documentation accurately reflects the services I personally performed and treatment decisions made by me in consultation with the attending physician.    Mario Echeverria DO  Resident Psychiatrist, PGY-2  Pager: [phone]    Attestation:  This patient has been seen and evaluated by me, Jhony Lamb.  I have discussed this patient with the house staff team including the resident and medical student and I agree with the findings and plan in this note.    I have reviewed today's vital signs, medications, labs and imaging. Jhony Lamb MD       Interim History:   The patient's care was discussed with the treatment team and chart including vitals, medication list and staff notes were reviewed.     Sleep: 5.75 hrs  PRN medications: None    Staff Report: Active in the milieu. Calm and socially appropriate. Participating in groups. Still having mild distortion in thinking.     Interview:  Patient was interviewed in the conference room this AM.  Was polite and cooperative.      When asked about mood he says, \"I have a pretty good mood. I feel sharp.\" Otherwise, reports being constipated. He had a BM yesterday, but is feeling constipated. Discussed using prune juice and/or miralax. He would like to try the prune juice. Thinks he has some neck stiffness, but attributes this to crunches (he does 500 total daily in sets of 40-50 at a time). When asked about going back to work he says, \"it sounds ok. I think I will get used to it pretty quickly. I think I will feel just fine.\" When asked about concerns about his mood at work he said, \"I don't think that will be a problem.\"    We further discussed Juan Daniel's belief that two patients on the unit are not in fact patients. He said, " "\"when you've been surveilled for a very long time, you start to get crazy ideas.\" He still believes that there are people on the unit that are not who they say they are, but is not sure if they are the original two patients he suspected. He brings up the concept of lucid dreaming and his desire to have lucid dreams. He downloaded an cory on his phone that assists in this process. He also describes having a conversation with some people on the unit about all of them having a similar dream about \"fast zombies.\" He thinks this might be some type of coincidence or telepathy. He mentioned that he spoke with a few of his colleagues at work about aliens and showed them the video of the UFO and that one was receptive to it and the other was \"bullshitting.\"     Reports that he did not sleep as well last night and feels \"a tiny bit tired\" today. We also discussed his caffeine use, which his parents had reported had increased. He says \"that's not true.\" He says over the past year his average coffee consumption has been 8 cups per day. Recently over the past few weeks has switched to 3-4 bottles of five hour energy daily. Says he uses them to stay alert at work. Does not think this is too much caffeine use. Discussed the fact that this is more than the recommended daily caffeine use, and he is willing to slowly cut back on it. Plans to call his parents today to talk about the treatment plan.     Of note, the team members spoke with Juan Daniel's parents today for collateral information. They said that this current episode is similar to his last episode in [place] in 2014. They reported that last week Juan Daniel began talking to them about being followed by aliens. He was not distressed by this, but rather said that it was an honor to be followed and/or used by them. He also took a picture of lights in the jcarlos, which he was convinced was a UFO. He was disappointed to find out that his parents were distressed by his talk of aliens and " "didn't agree with him. They think this episode was \"rather sudden,\" and cannot identify clear triggers. He has had increased stress at work, but this has been a chronic problem over the last year. They noted that he has expressed to them that he is \"not measuring up\" on the job and has begun running on his routes to catch up. These feelings of being inadequate at work do not line up with his boss's opinion, who told Juan Daniel's brother that Juan Daniel is \"a rockstar\" at his job. His parents also think that his caffeine consumption has increased and estimates that he drinks 10 cups of coffee per day and has been recently drinking energy drinks. He has also been sleeping slightly less (3-4 hours per night) due to being out until 11 pm with a co-worker earlier last week. They have not noted any irritability or behavioral changes and say he is generally a very easy person to live with. His father mentions that he plays 2-3 hours of video games per night and wonders if \"the odd content\" are contributing to his delusions. They also report that Juan Daniel has not taken medications for a year or seen a psychiatrist recently. He made the decision himself to go off his medications, and while they were upset about this decision, they did not say anything to him about it.     Review of systems:     The 10 point Review of Systems is negative other than noted in above note.          Medications:     Current Facility-Administered Medications   Medication     risperiDONE (risperDAL) tablet 4 mg     diphenhydrAMINE (BENADRYL) 25 mg, acetaminophen (TYLENOL) 325 mg alternative for Tylenol PM     multivitamin, therapeutic (THERA-VIT) tablet 1 tablet     hydrOXYzine (ATARAX) tablet 25 mg     acetaminophen (TYLENOL) tablet 650 mg     alum & mag hydroxide-simethicone (MYLANTA ES/MAALOX  ES) suspension 30 mL     traZODone (DESYREL) tablet 50 mg     OLANZapine (zyPREXA) tablet 10 mg    Or     OLANZapine (zyPREXA) injection 10 mg     nicotine " "polacrilex (COMMIT) lozenge 2-4 mg     magnesium hydroxide (MILK OF MAGNESIA) suspension 30 mL             Allergies:   No Known Allergies         Psychiatric Examination:   Weight is 167 lbs 0 oz  Body mass index is 21.44 kg/(m^2).  Last Vitals: /78  Pulse 71  Temp 97.3  F (36.3  C)  Resp 16  Ht 1.88 m (6' 2\")  Wt 75.8 kg (167 lb)  SpO2 100%  BMI 21.44 kg/m2    Appearance:  awake, alert, adequately groomed and appeared as age stated  Attitude:  cooperative  Eye Contact:  good, intense, looking around room  Mood:  \"I'm in a pretty good mood\"  Affect:  mood congruent, intensity is blunted and constricted mobility  Speech:  clear, coherent  Psychomotor Behavior:  no evidence of tardive dyskinesia, dystonia, or tics and intact station, gait and muscle tone  Thought Process:  linear and goal oriented  Associations:  no loose associations  Thought Content:  no evidence of suicidal ideation or homicidal ideation, no auditory hallucinations present and no visual hallucinations present, paranoid delusions and thinking that other patients are imposters  Insight:  limited  Judgment:  fair to good  Oriented to:  time, person, and place  Attention Span and Concentration:  fair  Recent and Remote Memory:  intact  Language: fluent English  Fund of Knowledge: appropriate  Muscle Strength and Tone: normal  Gait and Station: Normal         Labs:     Results for orders placed or performed during the hospital encounter of 03/29/18   Drug abuse screen 6 urine (chem dep) ([facility])   Result Value Ref Range    Amphetamine Qual Urine Negative NEG^Negative    Barbiturates Qual Urine Negative NEG^Negative    Benzodiazepine Qual Urine Negative NEG^Negative    Cannabinoids Qual Urine Negative NEG^Negative    Cocaine Qual Urine Negative NEG^Negative    Ethanol Qual Urine Negative NEG^Negative    Opiates Qualitative Urine Negative NEG^Negative   UA reflex to Microscopic and Culture   Result Value Ref Range    Color Urine Light Yellow  "    Appearance Urine Clear     Glucose Urine Negative NEG^Negative mg/dL    Bilirubin Urine Negative NEG^Negative    Ketones Urine Negative NEG^Negative mg/dL    Specific Gravity Urine 1.012 1.003 - 1.035    Blood Urine Negative NEG^Negative    pH Urine 5.0 5.0 - 7.0 pH    Protein Albumin Urine Negative NEG^Negative mg/dL    Urobilinogen mg/dL Normal 0.0 - 2.0 mg/dL    Nitrite Urine Negative NEG^Negative    Leukocyte Esterase Urine Negative NEG^Negative    Source Unspecified Urine    TSH with free T4 reflex   Result Value Ref Range    TSH 1.42 0.40 - 4.00 mU/L   Basic metabolic panel   Result Value Ref Range    Sodium 142 133 - 144 mmol/L    Potassium 3.7 3.4 - 5.3 mmol/L    Chloride 107 94 - 109 mmol/L    Carbon Dioxide 26 20 - 32 mmol/L    Anion Gap 9 3 - 14 mmol/L    Glucose 136 (H) 70 - 99 mg/dL    Urea Nitrogen 14 7 - 30 mg/dL    Creatinine 0.75 0.66 - 1.25 mg/dL    GFR Estimate >90 >60 mL/min/1.7m2    GFR Estimate If Black >90 >60 mL/min/1.7m2    Calcium 8.8 8.5 - 10.1 mg/dL   CBC with platelets differential   Result Value Ref Range    WBC 6.0 4.0 - 11.0 10e9/L    RBC Count 4.67 4.4 - 5.9 10e12/L    Hemoglobin 14.1 13.3 - 17.7 g/dL    Hematocrit 42.6 40.0 - 53.0 %    MCV 91 78 - 100 fl    MCH 30.2 26.5 - 33.0 pg    MCHC 33.1 31.5 - 36.5 g/dL    RDW 12.8 10.0 - 15.0 %    Platelet Count 260 150 - 450 10e9/L    Diff Method Automated Method     % Neutrophils 68.2 %    % Lymphocytes 26.0 %    % Monocytes 4.5 %    % Eosinophils 0.8 %    % Basophils 0.3 %    % Immature Granulocytes 0.2 %    Nucleated RBCs 0 0 /100    Absolute Neutrophil 4.1 1.6 - 8.3 10e9/L    Absolute Lymphocytes 1.6 0.8 - 5.3 10e9/L    Absolute Monocytes 0.3 0.0 - 1.3 10e9/L    Absolute Eosinophils 0.1 0.0 - 0.7 10e9/L    Absolute Basophils 0.0 0.0 - 0.2 10e9/L    Abs Immature Granulocytes 0.0 0 - 0.4 10e9/L    Absolute Nucleated RBC 0.0    Lipid panel   Result Value Ref Range    Cholesterol 145 <200 mg/dL    Triglycerides 57 <150 mg/dL    HDL  Cholesterol 86 >39 mg/dL    LDL Cholesterol Calculated 48 <100 mg/dL    Non HDL Cholesterol 59 <130 mg/dL   Hepatic panel   Result Value Ref Range    Bilirubin Direct 0.1 0.0 - 0.2 mg/dL    Bilirubin Total 0.6 0.2 - 1.3 mg/dL    Albumin 3.5 3.4 - 5.0 g/dL    Protein Total 6.5 (L) 6.8 - 8.8 g/dL    Alkaline Phosphatase 52 40 - 150 U/L    ALT 32 0 - 70 U/L    AST 21 0 - 45 U/L   Folate   Result Value Ref Range    Folate 25.7 >5.4 ng/mL   Hematocrit   Result Value Ref Range    Hematocrit 42.6 40.0 - 53.0 %   Vitamin B12   Result Value Ref Range    Vitamin B12 703 193 - 986 pg/mL

## 2018-04-03 NOTE — PROGRESS NOTES
"Pt attended community meeting and was observed in the milieu. Pt was pleasant and open to check in. Pt rated feeling a 5/10 on mood and reported no anxiety. Pt denied all hallucinations and delusions. This writer asked if Pt's delusions were better or worse than admission, Pt responded they are \"about the same\". Pt then went on to say he doesn't think anything needs to change in regards to his thinking or reality orientation.        04/03/18 1841   Behavioral Health   Hallucinations denies / not responding to hallucinations   Thinking distractable   Orientation person: oriented;place: oriented;date: oriented;time: oriented   Memory baseline memory   Insight denial of illness   Eye Contact at examiner   Affect full range affect   Mood mood is calm   Physical Appearance/Attire attire appropriate to age and situation   Hygiene well groomed   1. Wish to be Dead No   2. Non-Specific Active Suicidal Thoughts  No   Psycho Education   Type of Intervention 1:1 intervention   Response participates, initiates socially appropriate   Hours 0.5   Treatment Detail Check in     "

## 2018-04-04 VITALS
TEMPERATURE: 97 F | WEIGHT: 165 LBS | SYSTOLIC BLOOD PRESSURE: 121 MMHG | OXYGEN SATURATION: 100 % | BODY MASS INDEX: 21.17 KG/M2 | HEIGHT: 74 IN | DIASTOLIC BLOOD PRESSURE: 78 MMHG | RESPIRATION RATE: 16 BRPM | HEART RATE: 71 BPM

## 2018-04-04 PROCEDURE — 99238 HOSP IP/OBS DSCHRG MGMT 30/<: CPT | Mod: GC | Performed by: PSYCHIATRY & NEUROLOGY

## 2018-04-04 ASSESSMENT — ACTIVITIES OF DAILY LIVING (ADL)
GROOMING: INDEPENDENT
DRESS: INDEPENDENT
ORAL_HYGIENE: INDEPENDENT
LAUNDRY: WITH SUPERVISION

## 2018-04-04 NOTE — PROGRESS NOTES
04/04/18 1405   Patient Belongings   General Info Comment Pt recived all of his belonging.      <A               Admission:  I am responsible for any personal items that are not sent to the safe or pharmacy.  [facility] is not responsible for loss, theft or damage of any property in my possession.    Signature:  _________________________________ Date: _______  Time: _____                                              Staff Signature:  ____________________________ Date: ________  Time: _____      2nd Staff person, if patient is unable/unwilling to sign:    Signature: ________________________________ Date: ________  Time: _____     Discharge:  [facility] has returned all of my personal belongings:    Signature: _________________________________ Date: ________  Time: _____                                          Staff Signature:  ____________________________ Date: ________  Time: _____

## 2018-04-04 NOTE — DISCHARGE SUMMARY
"    ----------------------------------------------------------------------------------------------------------  [facility], [place]   Discharge Summary  Hospital Day #6      Juan Daniel Woodall MRN# [id number]   Age: 42 year old YOB: 1975     Date of Admission:  3/29/2018  Date of Discharge:  4/4/2018  Admitting Physician:  Jhony Lamb MD  Discharge Physician:  Jhony Lamb MD         Event Leading to Hospitalization:     Juan Daniel Woodall is a 42 year old  male with a significant past psychiatric history of  bipolar disorder who presents with concerns raised by his family of behavioral concerns that are similar to previous episode in 2014.     Patient states that his brother insisted he come to be evaluated today because of things he has been telling his family regarding his beliefs about the world.  Since moving back home to [place] several years ago patient has been working as a  at the post [employer].  Since the holidays work has not slowed down and patient has continued to work 6 days a week 10-12 hours a day leading to reduced sleep and leisure time.  He reports he started being followed about 2 weeks ago and they were \"very obvious about it, taking videos of me\".  He also noted people giving him dirty looks; he thinks it is because he was smoking. 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  He reports the anger had built up and he suddenly realized that his work was for a purpose: working for extraterrestrial life who are on earth.  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  He further elaborated \"I believe in an international effort with government organizations and different countries and different species with extraterrestrial beings involved are trying to help mankind improve his condition\".  When " he told his family about these beliefs his brother insisted on coming to the hospital for an evaluation.    See Admission note by Jhony Lamb MD, on 3/29/2018 for additional details.          Diagnoses:     # Schizophrenia vs Schizoaffective vs Bipolar disorder w/ psychotic features         Labs:     Utox - negative  UA wnl  TSH 1.42  BMP with glucose 136, otherwise wnl  CBC wnl  Lipid profile: cholesterol 145, triglycerides 57, HDL 86, LDL 48  Hepatic panel with protein 6.5, otherwise wnl  Folate 25.7  B12 703         Consults:     None         Hospital Course:      Diagnostic Impression: Juan Daniel Woodall is a 42 year old male with a history of bipolar disorder who presented to the [facility] following concerning behaviors and significant delusions in the context of increased work stress. The patient's last psychiatric hospitalization was in [place], [place] in 2014.  The patient is not currently followed by a psychiatrist. Family history is notable for bipolar disorder, depression and suicide. Current psychosocial stressors include increased work hours, belief that he is being followed, concerns for hidden presence of extraterrestrials. The patient denies recent drug abuse or self injurious behaviors. The MSE is notable for pleasant gentleman who tends to ramble at times with significant delusions, paranoia. The patient's reported symptoms of delusions, paranoia, thought broadcasting, suggestion that he has special carver, possible auditory/visual hallucinations, anhedonia are consistent with diagnosis of schizophreniform disorder, and if viewed in the context of similar episode in 2014 may meet criteria for schizophrenia at this time.     Hospital course: Juan Daniel Woodall was admitted to station 22 as a voluntary patient, under the care of Dr. Kam. The patient's family shared with the team that the patient self-discontinued medications one year ago, and his delusions have slowly becoming more prominent. Aside from his  "delusions, he is able to function appropriately at work, and there were no safety concerns. The patient allowed that there is a chance that his thoughts were not reality based, and he was willing to take medications. Risperidone was initiated to target delusions, and this was titrated to a final dose of 4 mg HS. The patient tolerated risperidone well, and he denied side effects. By the day of discharge, he reported no change in his paranoid delusions. For example, he believed that other patients on the unit were plants, sent there to surveil him. He was appropriate and pleasant while on the unit. He was looking forward to returning to work, and there were no safety concerns. He was discharged home with plans to follow up with a new outpatient Psychiatry provider.     Risk Assessment:  Juan Daniel Woodall has notable risk factors for self-harm, including psychosis. However, risk is mitigated by absence of past attempts and ability to volunteer a safety plan.Additional steps taken to minimize risk include: Crisis resources. Therefore, based on all available evidence including the factors cited above, Juan Daniel Woodall does not appear to be at imminent risk for self-harm, and is appropriate for outpatient level of care.     This document serves as a transfer of care to Juan Daniel Woodall's outpatient providers.         Discharge Medications:     Risperidone 4 mg HS         Psychiatric Examination:   /78  Pulse 71  Temp 97  F (36.1  C)  Resp 16  Ht 1.88 m (6' 2\")  Wt 74.8 kg (165 lb)  SpO2 100%  BMI 21.18 kg/m2  Appearance:  awake, alert and adequately groomed  Attitude:  cooperative  Eye Contact:  good  Mood:  better  Affect:  appropriate and in normal range and mood congruent  Speech:  clear, coherent and normal prosody  Psychomotor Behavior:  no evidence of tardive dyskinesia, dystonia, or tics  Thought Process:  logical, linear and goal oriented  Associations:  no loose associations  Thought Content: Ongoing paranoid " delusions  Insight:  limited  Judgment:  fair  Oriented to:  time, person, and place  Attention Span and Concentration:  intact  Recent and Remote Memory:  intact  Language: Conversant in English  Fund of Knowledge: appropriate  Muscle Strength and Tone: grossly normal  Gait and Station: Normal         Discharge Plan:   Health Care Follow-up Appointments:      Sally Rios NP-  10am (Medication Management)  [facility] [address], [address]  [place] [address]  Phone: [phone] Fax: [phone]     --------------------------------------------------------------------------------------------------------------------------------  Pt seen and discussed with my attending, MD Wyatt Black MD  Psychiatry PGY1      Attestation:   The patient has been seen and evaluated by me,  Jhony Lamb. I have examined the patient today and reviewed the discharge plan with the resident and medical student. I agree with the final assessment and plan, as noted in the discharge summary. I have reviewed today's vital signs, medications, labs and imaging.  Total time discharge plannin minutes  Jhony Lamb MD

## 2018-04-04 NOTE — PLAN OF CARE
Problem: Sensory Perception Impairment (Psychotic Signs/Symptoms) (Adult)  Goal: Decrease Frequency/Intensity of Sensory Symptoms (Psychotic Signs/Symptoms)  Outcome: Adequate for Discharge Date Met: 04/04/18  Juan Daniel states he is certain others were taking pictures of him prior to admission and that there is a plan in process regarding extraterrestrial beings working with humans-states he does not know how long it will be before they reach goal, but is certain it is happening-able to discuss MD perspective of delusional thinking and state he disagrees as he is certain his experiences are based in reality-more guarded in discussion of MJ use-acknowledges illicit drug use, but refused to consider may contribute to current mental health status and refused to consider refraining-states mood is good and he is pleased with plan to return to parents home-Addendum-Juan Daniel discharged [address] care of self via taxi home where he lives with parents

## 2020-10-19 ENCOUNTER — NURSE TRIAGE (OUTPATIENT)
Dept: NURSING | Facility: CLINIC | Age: 45
End: 2020-10-19

## 2020-10-19 NOTE — TELEPHONE ENCOUNTER
I advised he has to call the clinic back at 7:30 a.m. to ask them where they are doing testing. I also gave him the [facility] hotline number to call and find out where they are doing same day testing.  Lidia Bynum RN  [facility]    Additional Information    Negative: Nursing judgment    Negative: Nursing judgment    Negative: Nursing judgment    Negative: Nursing judgment    Information only question and nurse able to answer    Protocols used: NO PROTOCOL AVAILABLE - INFORMATION ONLY-A-[place]